# Patient Record
Sex: MALE | Race: WHITE | NOT HISPANIC OR LATINO | URBAN - METROPOLITAN AREA
[De-identification: names, ages, dates, MRNs, and addresses within clinical notes are randomized per-mention and may not be internally consistent; named-entity substitution may affect disease eponyms.]

---

## 2018-07-11 ENCOUNTER — INPATIENT (INPATIENT)
Facility: HOSPITAL | Age: 38
LOS: 1 days | Discharge: HOME | End: 2018-07-13
Attending: INTERNAL MEDICINE | Admitting: INTERNAL MEDICINE

## 2018-07-11 VITALS
TEMPERATURE: 98 F | OXYGEN SATURATION: 100 % | HEART RATE: 73 BPM | DIASTOLIC BLOOD PRESSURE: 69 MMHG | SYSTOLIC BLOOD PRESSURE: 136 MMHG | RESPIRATION RATE: 18 BRPM

## 2018-07-11 DIAGNOSIS — Z98.890 OTHER SPECIFIED POSTPROCEDURAL STATES: Chronic | ICD-10-CM

## 2018-07-11 LAB
ALBUMIN SERPL ELPH-MCNC: 4 G/DL — SIGNIFICANT CHANGE UP (ref 3.5–5.2)
ALP SERPL-CCNC: 190 U/L — HIGH (ref 30–115)
ALT FLD-CCNC: 548 U/L — HIGH (ref 0–41)
ANION GAP SERPL CALC-SCNC: 13 MMOL/L — SIGNIFICANT CHANGE UP (ref 7–14)
AST SERPL-CCNC: 241 U/L — HIGH (ref 0–41)
BASOPHILS # BLD AUTO: 0.03 K/UL — SIGNIFICANT CHANGE UP (ref 0–0.2)
BASOPHILS NFR BLD AUTO: 0.4 % — SIGNIFICANT CHANGE UP (ref 0–1)
BILIRUB DIRECT SERPL-MCNC: 2 MG/DL — HIGH (ref 0–0.2)
BILIRUB INDIRECT FLD-MCNC: 1.2 MG/DL — SIGNIFICANT CHANGE UP (ref 0.2–1.2)
BILIRUB SERPL-MCNC: 3.2 MG/DL — HIGH (ref 0.2–1.2)
BUN SERPL-MCNC: 15 MG/DL — SIGNIFICANT CHANGE UP (ref 10–20)
CALCIUM SERPL-MCNC: 9.4 MG/DL — SIGNIFICANT CHANGE UP (ref 8.5–10.1)
CHLORIDE SERPL-SCNC: 102 MMOL/L — SIGNIFICANT CHANGE UP (ref 98–110)
CO2 SERPL-SCNC: 24 MMOL/L — SIGNIFICANT CHANGE UP (ref 17–32)
CREAT SERPL-MCNC: 0.7 MG/DL — SIGNIFICANT CHANGE UP (ref 0.7–1.5)
EOSINOPHIL # BLD AUTO: 0.1 K/UL — SIGNIFICANT CHANGE UP (ref 0–0.7)
EOSINOPHIL NFR BLD AUTO: 1.4 % — SIGNIFICANT CHANGE UP (ref 0–8)
GLUCOSE SERPL-MCNC: 116 MG/DL — HIGH (ref 70–99)
HCT VFR BLD CALC: 41.2 % — LOW (ref 42–52)
HGB BLD-MCNC: 13.8 G/DL — LOW (ref 14–18)
IMM GRANULOCYTES NFR BLD AUTO: 0.4 % — HIGH (ref 0.1–0.3)
LACTATE SERPL-SCNC: 1.7 MMOL/L — SIGNIFICANT CHANGE UP (ref 0.5–2.2)
LIDOCAIN IGE QN: 41 U/L — SIGNIFICANT CHANGE UP (ref 7–60)
LYMPHOCYTES # BLD AUTO: 1.63 K/UL — SIGNIFICANT CHANGE UP (ref 1.2–3.4)
LYMPHOCYTES # BLD AUTO: 22.5 % — SIGNIFICANT CHANGE UP (ref 20.5–51.1)
MCHC RBC-ENTMCNC: 28.4 PG — SIGNIFICANT CHANGE UP (ref 27–31)
MCHC RBC-ENTMCNC: 33.5 G/DL — SIGNIFICANT CHANGE UP (ref 32–37)
MCV RBC AUTO: 84.8 FL — SIGNIFICANT CHANGE UP (ref 80–94)
MONOCYTES # BLD AUTO: 0.69 K/UL — HIGH (ref 0.1–0.6)
MONOCYTES NFR BLD AUTO: 9.5 % — HIGH (ref 1.7–9.3)
NEUTROPHILS # BLD AUTO: 4.77 K/UL — SIGNIFICANT CHANGE UP (ref 1.4–6.5)
NEUTROPHILS NFR BLD AUTO: 65.8 % — SIGNIFICANT CHANGE UP (ref 42.2–75.2)
NRBC # BLD: 0 /100 WBCS — SIGNIFICANT CHANGE UP (ref 0–0)
PLATELET # BLD AUTO: 256 K/UL — SIGNIFICANT CHANGE UP (ref 130–400)
POTASSIUM SERPL-MCNC: 4.6 MMOL/L — SIGNIFICANT CHANGE UP (ref 3.5–5)
POTASSIUM SERPL-SCNC: 4.6 MMOL/L — SIGNIFICANT CHANGE UP (ref 3.5–5)
PROT SERPL-MCNC: 7.3 G/DL — SIGNIFICANT CHANGE UP (ref 6–8)
RBC # BLD: 4.86 M/UL — SIGNIFICANT CHANGE UP (ref 4.7–6.1)
RBC # FLD: 13.4 % — SIGNIFICANT CHANGE UP (ref 11.5–14.5)
SODIUM SERPL-SCNC: 139 MMOL/L — SIGNIFICANT CHANGE UP (ref 135–146)
WBC # BLD: 7.25 K/UL — SIGNIFICANT CHANGE UP (ref 4.8–10.8)
WBC # FLD AUTO: 7.25 K/UL — SIGNIFICANT CHANGE UP (ref 4.8–10.8)

## 2018-07-11 RX ORDER — ENOXAPARIN SODIUM 100 MG/ML
40 INJECTION SUBCUTANEOUS EVERY 24 HOURS
Qty: 0 | Refills: 0 | Status: DISCONTINUED | OUTPATIENT
Start: 2018-07-11 | End: 2018-07-11

## 2018-07-11 RX ORDER — SODIUM CHLORIDE 9 MG/ML
3 INJECTION INTRAMUSCULAR; INTRAVENOUS; SUBCUTANEOUS ONCE
Qty: 0 | Refills: 0 | Status: COMPLETED | OUTPATIENT
Start: 2018-07-11 | End: 2018-07-11

## 2018-07-11 RX ORDER — SODIUM CHLORIDE 9 MG/ML
1000 INJECTION INTRAMUSCULAR; INTRAVENOUS; SUBCUTANEOUS
Qty: 0 | Refills: 0 | Status: DISCONTINUED | OUTPATIENT
Start: 2018-07-12 | End: 2018-07-13

## 2018-07-11 RX ADMIN — SODIUM CHLORIDE 3 MILLILITER(S): 9 INJECTION INTRAMUSCULAR; INTRAVENOUS; SUBCUTANEOUS at 14:46

## 2018-07-11 NOTE — ED PROVIDER NOTE - OBJECTIVE STATEMENT
Pt is a 36y/o male with no sig pmhx presents today for eval of painless jaundice, diffuse pruritis, and emmie colored stool for approx 1 week. Pt has been worked up by Dr. Sue of GI during this time and was found to have dilated CBD of 11mm. Pt denies abd pain, CP, SOB, fever, chills, n/v/d.

## 2018-07-11 NOTE — ED PROVIDER NOTE - NS ED ROS FT
Eyes:  No visual changes, eye pain or discharge.  ENMT:  No hearing changes, pain, discharge or infections. No neck pain or stiffness.  Cardiac:  No chest pain, SOB   Respiratory:  No cough or respiratory distress. No hemoptysis. No history of asthma or RAD.  GI:  No nausea, vomiting, diarrhea or abdominal pain.  MS:  No myalgia, muscle weakness, joint pain or back pain.  Neuro:  No headache or weakness.  No LOC.  Skin:  No skin rash.   Endocrine: No history of thyroid disease or diabetes.  Except as documented in the HPI,  all other systems are negative.

## 2018-07-11 NOTE — H&P ADULT - ATTENDING COMMENTS
37M p/w painless jaundice and 100lb wt loss found to have obstructive biliary disease with "stones in gall bladder" done by PMD. No toxic signs and doubt malignancy. Will get MRCP today and [facility] consultation for ERCP and biopsies / stent as needed. No need for abx at this time.   Will follow  GI/DVT prophylaxis

## 2018-07-11 NOTE — H&P ADULT - GASTROINTESTINAL DETAILS
bowel sounds normal/no distention/no masses palpable/soft/nontender/no guarding/no bruit/no rebound tenderness/no rigidity

## 2018-07-11 NOTE — H&P ADULT - ASSESSMENT
36 y/o Obese M w/ no significant PMHx was sent by his Gastroenterologist by symptoms of jaundice and pruritis x3 weeks. Pt was found to have a dislodged gallstone in the CBD w/ dilation of 11mm on RUQ sono at his GI's office. In the ED, vitals were stable and PE only remarkable for scleral icterus and corbett's sign was negative. T bili 3.2 w/ D bili of 2. ,  and . Lipase 41. Pt was admitted to medicine for management of choledocholithiasis    #Scleral icterus, dark urine, acholic stools and Transaminitis 2/2 Choledocholithiasis 2/2 Rapid weight loss of 100 lbs in the past 6 months - Stable  - In the ED, vitals stable. PE only remarkable for scleral icterus. Negative corbett's sign - No evidence of SIRS  - T bili 3.2 w/ D bili 2. . , . Alt 548.   - Lipase 41  - Currently awaiting GI recs - Consult placed for Dr. Alicia Service - Pt will require MRCP/ERCP  - Will keep pt NPO after midnight for any intervention in the am  - c/w gentle hydration at midnight  - f/u A1c - pt has family hx of T2DM  - f/u lipid profile     #Activity - Ambulate as tolerated  #Diet - DASH Diet - NPO after midnight  #DVT PPX - Lovenox  #GI PPX - Protonix  #Dispo - from home  #Code - full 38 y/o Obese M w/ no significant PMHx was sent by his Gastroenterologist by symptoms of jaundice and pruritis x3 weeks. Pt was found to have a dislodged gallstone in the CBD w/ dilation of 11mm on RUQ sono at his GI's office. In the ED, vitals were stable and PE only remarkable for scleral icterus and corbett's sign was negative. T bili 3.2 w/ D bili of 2. ,  and . Lipase 41. Pt was admitted to medicine for management of choledocholithiasis    #Scleral icterus, dark urine, acholic stools and Transaminitis 2/2 Choledocholithiasis 2/2 Rapid weight loss of 100 lbs in the past 6 months - Stable  - In the ED, vitals stable. PE only remarkable for scleral icterus. Negative corbett's sign - No evidence of SIRS  - T bili 3.2 w/ D bili 2. . , . Alt 548.   - Lipase 41  - Currently awaiting GI recs - Consult placed for Dr. Alicia Service - Pt will require MRCP/ERCP  - Will keep pt NPO after midnight for any intervention in the am  - c/w gentle hydration at midnight  - Monitor for signs of fever  - Trend LFT's  - f/u A1c - pt has family hx of T2DM  - f/u lipid profile     #Activity - Ambulate as tolerated  #Diet - DASH Diet - NPO after midnight  #DVT PPX - Lovenox  #GI PPX - Protonix  #Dispo - from home  #Code - full 36 y/o Obese M w/ no significant PMHx was sent by his Gastroenterologist by symptoms of jaundice and pruritis x3 weeks. Pt was found to have a dislodged gallstone in the CBD w/ dilation of 11mm on RUQ sono at his GI's office. In the ED, vitals were stable and PE only remarkable for scleral icterus and corbett's sign was negative. T bili 3.2 w/ D bili of 2. ,  and . Lipase 41. Pt was admitted to medicine for management of choledocholithiasis    #Painless jaundice, Scleral icterus, dark urine, acholic stools and Transaminitis 2/2 Choledocholithiasis 2/2 Rapid intentional weight loss - r/o cholangiocarcinoma - Stable  - In the ED, vitals stable. PE only remarkable for scleral icterus. Negative corbett's sign - No evidence of SIRS  - T bili 3.2 w/ D bili 2. . , . Alt 548.   - Lipase 41  - As per GI - Dr. Sue/Dr. Alicia - MRI of liver and Pancreas w/ Gadolinium. MRCP.  - Will keep pt NPO after midnight for any intervention in the am  - c/w gentle hydration at midnight  - Monitor for signs of fever  - Trend LFT's  - f/u CA 19-9, CEA, AFP  - f/u A1c - pt has family hx of T2DM  - f/u lipid profile     #Activity - Ambulate as tolerated  #Diet - DASH Diet - NPO after midnight  #DVT PPX - Lovenox  #GI PPX - Protonix  #Dispo - from home  #Code - full

## 2018-07-11 NOTE — H&P ADULT - HISTORY OF PRESENT ILLNESS
36 y/o Obese M w/ no significant PMHx was sent by his Gastroenterologist by symptoms of jaundice and pruritis. History dates back to 3 weeks ago when started experiencing itching in his distal upper and lower extremities followed by dark orange urine and acholic stools. Pt then noticed his skin becoming jaundiced and he went to his PCP and found that pt had transaminitis. He was then referred to his gastroenterologist and sonogram of the RUQ demonstrated a dilated CBDuct of 11mm. Pt also had elevated Total bilirubin of 3 and Direct bilirubin of 2.3. As per the GI doctors instructions pt came to the ED. Denies nausea, vomiting, diarrhea, fever, chills, shortness of breath, dysuria, frequency or urgency, chest pain, palpitations or fatigue.    Pt endorsed that he was participating in a weight loss challenge and lost 100lbs in 6-8 months.

## 2018-07-11 NOTE — ED PROVIDER NOTE - ATTENDING CONTRIBUTION TO CARE
Pt is a 38y/o male with no sig pmhx presents today for eval of painless jaundice, diffuse pruritis, and emmie colored stool for approx 1 week. Pt has been worked up by Dr. Sue of GI during this time and was found to have dilated CBD of 11mm. Pt denies abd pain, CP, SOB, fever, chills, n/v/d.    Vital Signs: I have reviewed the initial vital signs.  Constitutional: NAD, well-nourished, appears stated age, no acute distress.  HEENT: Airway patent, moist MM, no erythema/swelling/deformity of oral structures. EOMI, PERRLA. Mild scleral icterus bilaterally  SKIN: Mild jaundice diffusely, no rashes  CV: regular rate, regular rhythm, well-perfused extremities, 2+ b/l DP and radial pulses equal.  Lungs: BCTA, no increased WOB.  ABD: NTND, no guarding or rebound, no pulsatile mass, no hernias.   MSK: Neck supple, nontender, nl ROM, no stepoff. Chest nontender. Back nontender in TLS spine or to b/l bony structures or flanks. Ext nontender, nl rom, no deformity.   INTEG: Skin warm, dry, no rash.  NEURO: A&Ox3, CN II-XII intact, normal strength 5/5 all 4 ext, nl sensation throughout, normal speech and coordination.  PSYCH: Calm, cooperative, normal affect and interaction.      Patient well-appearing except for jaundice. Will speak with GI, likely admit for further work up.

## 2018-07-11 NOTE — ED PROVIDER NOTE - PHYSICAL EXAMINATION
VITAL SIGNS: I have reviewed nursing notes and confirm.  CONSTITUTIONAL: Well-developed; well-nourished; in no acute distress.   SKIN: skin exam is warm and dry, no acute rash.    HEAD: Normocephalic; atraumatic.  EYES:  + scleral icterus  ENT: No nasal discharge; airway clear.  CARD: S1, S2 normal; no murmurs, gallops, or rubs. Regular rate and rhythm.   RESP: No wheezes, rales or rhonchi.  ABD: Normal bowel sounds; soft; non-distended; non-tender  EXT: Normal ROM.  No clubbing, cyanosis or edema.   NEURO: Alert, oriented, grossly unremarkable

## 2018-07-11 NOTE — ED ADULT TRIAGE NOTE - CHIEF COMPLAINT QUOTE
Patient was experiencing jaundice, emmie colored stools and generalized itchiness. Had US done outpatient, sent in by PMD for enlarged bile duct and gallstones. Jese pain at this time

## 2018-07-11 NOTE — H&P ADULT - FAMILY HISTORY
Father  Still living? Unknown  Family history of type 2 diabetes mellitus in father, Age at diagnosis: Age Unknown     Mother  Still living? Unknown  Family history of breast cancer in mother, Age at diagnosis: Age Unknown

## 2018-07-11 NOTE — ED PROVIDER NOTE - PROGRESS NOTE DETAILS
Discussed case with Pt's GI specialist Dr. Sue and requests Dr. Bal consult as inpatient for extensive workup of painless jaundice. Discussed case with Pt's GI specialist Dr. Sue and requests Dr. Bal consult as inpatient for extensive workup of painless jaundice. No need for imaging in ED. Just labs Spoke with MAR for admission

## 2018-07-11 NOTE — CONSULT NOTE ADULT - ASSESSMENT
38 y/o Obese M w/ no significant PMHx was sent by his Gastroenterologist by symptoms of jaundice and pruritis x3 weeks. Pt was found to have a  CBD dilation of 11mm on RUQ sono at his GI's office, picture suggestive of choledocholithiasis.    - Abnormal LFTs with CBD dilatation:  patient was having abdominal pain prior to jaundice  rule out choledocholithiasis, vs less likely malignancy ( cholangiocarcinoma or pancreatic ca)  no signs of cholangitis at this point  - As per GI - Dr. Sue/Dr. Alicia - MRI of liver and Pancreas w/ Gadolinium. MRCP.  -  NPO after midnight for possible ERCP in the am  - monitor LFTs

## 2018-07-11 NOTE — CONSULT NOTE ADULT - SUBJECTIVE AND OBJECTIVE BOX
Chief Complaint:  Patient is a 37y old  Male who presents with a chief complaint of Choledocholithiasis (11 Jul 2018 16:10)      HPI:  36 y/o Obese M w/ no significant PMHx was sent by his Gastroenterologist by symptoms of jaundice and pruritis. History dates back to 3 weeks ago when started experiencing epigastric pain radiating to the back associated with nausea and vomiting that lasted for 2 days then started to have itching in his distal upper and lower extremities followed by dark orange urine and acholic stools. Pt then noticed his skin becoming jaundiced and he went to his PCP and found that pt had transaminitis. He was then referred to his gastroenterologist and sonogram of the RUQ demonstrated a dilated CBDuct of 11mm. Pt also had elevated Total bilirubin of 3 and Direct bilirubin of 2.3. As per the GI doctors instructions pt came to the ED. Denies nausea, vomiting, diarrhea, fever, chills, shortness of breath, dysuria, frequency or urgency, chest pain, palpitations or fatigue.    Pt endorsed that he was participating in a weight loss challenge and lost 100lbs in 6-8 months.   Allergies:  Ceclor (Hives)  penicillin (Hives)    [facility] Medications:  enoxaparin Injectable 40 milliGRAM(s) SubCutaneous every 24 hours      PMHX/PSHX:  No pertinent past medical history  S/P ACL repair  No significant past surgical history      Family history:  Family history of breast cancer in mother (Mother)  Family history of type 2 diabetes mellitus in father (Father)  No pertinent family history in first degree relatives    ROS:   Eyes:  Good vision, no reported pain  ENT:  No sore throat, pain, runny nose, dysphagia  CV:  No pain, palpitations, hypo/hypertension  Resp:  No dyspnea, cough, tachypnea, wheezing  GI:  see H&P  :  No pain, bleeding, incontinence, nocturia  Neuro:  No weakness, tingling, memory problems  Psych:  No fatigue, insomnia, mood problems, depression  Endocrine:  No polyuria, polydipsia, cold/heat intolerance  Heme:  No petechiae, ecchymosis, easy bruisability  Skin:  No rash, tattoos, scars, edema      PHYSICAL EXAM:   Vital Signs:  Vital Signs Last 24 Hrs  T(C): 36.4 (11 Jul 2018 16:34), Max: 36.8 (11 Jul 2018 12:12)  T(F): 97.5 (11 Jul 2018 16:34), Max: 98.2 (11 Jul 2018 12:12)  HR: 59 (11 Jul 2018 16:34) (59 - 73)  BP: 123/62 (11 Jul 2018 16:34) (123/62 - 136/69)  BP(mean): --  RR: 18 (11 Jul 2018 16:34) (18 - 18)  SpO2: 99% (11 Jul 2018 16:34) (99% - 100%)  Daily     Daily     GENERAL:  Appears stated age, well-groomed, well-nourished, no distress  HEENT:  NC/AT,  conjunctivae clear and pink, no thyromegaly, nodules, adenopathy, no JVD, sclera + jaundice  CHEST:  Full & symmetric excursion, no increased effort, breath sounds clear  HEART:  Regular rhythm, S1, S2, no murmur/rub/S3/S4, no abdominal bruit, no edema  ABDOMEN:  Soft, non-tender, non-distended, normoactive bowel sounds,  no masses ,no hepato-splenomegaly,   EXTEREMITIES:  no cyanosis,clubbing or edema  SKIN:  No rash/erythema/ecchymoses/petechiae/wounds/abscess/warm/dry  NEURO:  Alert, oriented, no asterixis, no tremor, no encephalopathy    LABS:                        13.8   7.25  )-----------( 256      ( 11 Jul 2018 13:53 )             41.2     07-11    139  |  102  |  15  ----------------------------<  116<H>  4.6   |  24  |  0.7    Ca    9.4      11 Jul 2018 13:53    TPro  7.3  /  Alb  4.0  /  TBili  3.2<H>  /  DBili  2.0<H>  /  AST  241<H>  /  ALT  548<H>  /  AlkPhos  190<H>  07-11    LIVER FUNCTIONS - ( 11 Jul 2018 13:53 )  Alb: 4.0 g/dL / Pro: 7.3 g/dL / ALK PHOS: 190 U/L / ALT: 548 U/L / AST: 241 U/L / GGT: x               Amylase Serum--      Lipase serum41

## 2018-07-11 NOTE — ED ADULT NURSE NOTE - OBJECTIVE STATEMENT
Pt was sent by PMD for MRI. Pt c/o jaundice, light colored stool, and itchiness x 3 weeks. Pt denies any pain at this time.  Pt had US done and showed pt had gallstones and enlarged bile duct.

## 2018-07-12 LAB
ALBUMIN SERPL ELPH-MCNC: 4 G/DL — SIGNIFICANT CHANGE UP (ref 3.5–5.2)
ALP SERPL-CCNC: 179 U/L — HIGH (ref 30–115)
ALT FLD-CCNC: 564 U/L — HIGH (ref 0–41)
ANION GAP SERPL CALC-SCNC: 11 MMOL/L — SIGNIFICANT CHANGE UP (ref 7–14)
AST SERPL-CCNC: 234 U/L — HIGH (ref 0–41)
BASOPHILS # BLD AUTO: 0.03 K/UL — SIGNIFICANT CHANGE UP (ref 0–0.2)
BASOPHILS NFR BLD AUTO: 0.5 % — SIGNIFICANT CHANGE UP (ref 0–1)
BILIRUB DIRECT SERPL-MCNC: 1.7 MG/DL — HIGH (ref 0–0.2)
BILIRUB DIRECT SERPL-MCNC: 1.7 MG/DL — HIGH (ref 0–0.2)
BILIRUB INDIRECT FLD-MCNC: 0.9 MG/DL — SIGNIFICANT CHANGE UP (ref 0.2–1.2)
BILIRUB INDIRECT FLD-MCNC: 0.9 MG/DL — SIGNIFICANT CHANGE UP (ref 0.2–1.2)
BILIRUB SERPL-MCNC: 2.6 MG/DL — HIGH (ref 0.2–1.2)
BILIRUB SERPL-MCNC: 2.6 MG/DL — HIGH (ref 0.2–1.2)
BLD GP AB SCN SERPL QL: SIGNIFICANT CHANGE UP
BUN SERPL-MCNC: 14 MG/DL — SIGNIFICANT CHANGE UP (ref 10–20)
CALCIUM SERPL-MCNC: 9.4 MG/DL — SIGNIFICANT CHANGE UP (ref 8.5–10.1)
CHLORIDE SERPL-SCNC: 102 MMOL/L — SIGNIFICANT CHANGE UP (ref 98–110)
CHOLEST SERPL-MCNC: 278 MG/DL — HIGH (ref 100–200)
CO2 SERPL-SCNC: 25 MMOL/L — SIGNIFICANT CHANGE UP (ref 17–32)
CREAT SERPL-MCNC: 0.8 MG/DL — SIGNIFICANT CHANGE UP (ref 0.7–1.5)
EOSINOPHIL # BLD AUTO: 0.1 K/UL — SIGNIFICANT CHANGE UP (ref 0–0.7)
EOSINOPHIL NFR BLD AUTO: 1.5 % — SIGNIFICANT CHANGE UP (ref 0–8)
ESTIMATED AVERAGE GLUCOSE: 123 MG/DL — HIGH (ref 68–114)
GLUCOSE SERPL-MCNC: 125 MG/DL — HIGH (ref 70–99)
HBA1C BLD-MCNC: 5.9 % — HIGH (ref 4–5.6)
HCT VFR BLD CALC: 41.1 % — LOW (ref 42–52)
HDLC SERPL-MCNC: 34 MG/DL — LOW (ref 40–125)
HGB BLD-MCNC: 13.6 G/DL — LOW (ref 14–18)
IMM GRANULOCYTES NFR BLD AUTO: 0.6 % — HIGH (ref 0.1–0.3)
LIPID PNL WITH DIRECT LDL SERPL: 218 MG/DL — HIGH (ref 4–129)
LYMPHOCYTES # BLD AUTO: 1.69 K/UL — SIGNIFICANT CHANGE UP (ref 1.2–3.4)
LYMPHOCYTES # BLD AUTO: 25.5 % — SIGNIFICANT CHANGE UP (ref 20.5–51.1)
MCHC RBC-ENTMCNC: 28 PG — SIGNIFICANT CHANGE UP (ref 27–31)
MCHC RBC-ENTMCNC: 33.1 G/DL — SIGNIFICANT CHANGE UP (ref 32–37)
MCV RBC AUTO: 84.6 FL — SIGNIFICANT CHANGE UP (ref 80–94)
MONOCYTES # BLD AUTO: 0.56 K/UL — SIGNIFICANT CHANGE UP (ref 0.1–0.6)
MONOCYTES NFR BLD AUTO: 8.5 % — SIGNIFICANT CHANGE UP (ref 1.7–9.3)
NEUTROPHILS # BLD AUTO: 4.2 K/UL — SIGNIFICANT CHANGE UP (ref 1.4–6.5)
NEUTROPHILS NFR BLD AUTO: 63.4 % — SIGNIFICANT CHANGE UP (ref 42.2–75.2)
NRBC # BLD: 0 /100 WBCS — SIGNIFICANT CHANGE UP (ref 0–0)
PLATELET # BLD AUTO: 262 K/UL — SIGNIFICANT CHANGE UP (ref 130–400)
POTASSIUM SERPL-MCNC: 4.5 MMOL/L — SIGNIFICANT CHANGE UP (ref 3.5–5)
POTASSIUM SERPL-SCNC: 4.5 MMOL/L — SIGNIFICANT CHANGE UP (ref 3.5–5)
PROT SERPL-MCNC: 6.9 G/DL — SIGNIFICANT CHANGE UP (ref 6–8)
RBC # BLD: 4.86 M/UL — SIGNIFICANT CHANGE UP (ref 4.7–6.1)
RBC # FLD: 13.7 % — SIGNIFICANT CHANGE UP (ref 11.5–14.5)
SODIUM SERPL-SCNC: 138 MMOL/L — SIGNIFICANT CHANGE UP (ref 135–146)
TOTAL CHOLESTEROL/HDL RATIO MEASUREMENT: 8.2 RATIO — HIGH (ref 4–5.5)
TRIGL SERPL-MCNC: 180 MG/DL — HIGH (ref 10–149)
TYPE + AB SCN PNL BLD: SIGNIFICANT CHANGE UP
WBC # BLD: 6.62 K/UL — SIGNIFICANT CHANGE UP (ref 4.8–10.8)
WBC # FLD AUTO: 6.62 K/UL — SIGNIFICANT CHANGE UP (ref 4.8–10.8)

## 2018-07-12 NOTE — PROGRESS NOTE ADULT - ASSESSMENT
38 y/o Obese M w/ no significant PMHx was sent by his Gastroenterologist by symptoms of jaundice and pruritis x3 weeks. Pt was found to have a dislodged gallstone in the CBD w/ dilation of 11mm on RUQ sono at his GI's office. Pt was admitted to medicine for management of choledocholithiasis as well as rule out other causes of Obstructive/Painless Jaundice R/O Cholangiocarcinoma or billary disease.    #Painless jaundice, Scleral icterus, dark urine, acholic stools and Transaminitis 2/2 Choledocholithiasis 2/2 Rapid intentional weight loss - r/o cholangiocarcinoma - Stable  - No evidence of SIRS  - T bili 3.2 w/ D bili 2. . , . Alt 548.   - Lipase 41  - As per GI - Dr. Sue/Dr. Alicia - MRI of liver and Pancreas w/ Gadolinium. MRCP.  - Will keep pt NPO after midnight for possible ERCP in AM  - c/w gentle hydration at midnight  - Monitor for signs of fever  - Trend LFT's  - f/u CA 19-9, CEA, AFP  - f/u A1c - pt has family hx of T2DM  - f/u lipid profile     #Activity - Ambulate as tolerated  #Diet - DASH Diet - NPO after midnight  #DVT PPX - Lovenox  #GI PPX - Protonix  #Dispo - from home  #Code - full 36 y/o Obese M w/ no significant PMHx was sent by his Gastroenterologist by symptoms of jaundice and pruritis x3 weeks. Pt was found to have a dislodged gallstone in the CBD w/ dilation of 11mm on RUQ sono at his GI's office. Pt was admitted to medicine for management of choledocholithiasis as well as rule out other causes of Obstructive/Painless Jaundice R/O Cholangiocarcinoma or billary disease.    #Painless jaundice, Scleral icterus, dark urine, acholic stools and Transaminitis 2/2 Choledocholithiasis 2/2 Rapid intentional weight loss - r/o cholangiocarcinoma - Stable  - No evidence of SIRS  - T bili now 2.6  w/ D bili 1.7  ,  Alt 548.   - Total Cholesterol 278, Tags 180, HDL 34,   - Lipase 41  - As per GI - Dr. Sue/Dr. Alicia - MRI of liver and Pancreas w/ Gadolinium. MRCP.  - Will keep pt NPO after midnight for possible ERCP in AM  - c/w gentle hydration at midnight  - Monitor for signs of fever  - Trend LFT's  - f/u CA 19-9, CEA, AFP  - f/u A1c - pt has family hx of T2DM       #Activity - Ambulate as tolerated  #Diet - DASH Diet - NPO after midnight  #DVT PPX - Lovenox  #GI PPX - Protonix  #Dispo - from home  #Code - full

## 2018-07-12 NOTE — PROGRESS NOTE ADULT - SUBJECTIVE AND OBJECTIVE BOX
SUBJECTIVE:    Patient is a 37y old Male who presents with a chief complaint of Choledocholithiasis   (11 Jul 2018 16:10) He was sent by his gastroenterologist  with sx of Jaundice and pruitis for 3 weeks. History of 100 lbs weight loss in 6-8 months. Patient was found to have dislodged gallstone in the CBD w/ dilation of 11mm on RUQ sono in his office. In the ED vitals were stable and PE only remarkable for Jaundice.     Currently admitted to medicine with the primary diagnosis of Choledocholithiasis vs Obstructive Liver Diseases    Today is hospital day 1d. This morning he is resting comfortably in bed and reports no new issues or overnight events.     PAST MEDICAL & SURGICAL HISTORY  No pertinent past medical history  S/P ACL repair    SOCIAL HISTORY:  Negative for smoking/alcohol/drug use.     ALLERGIES:  anesthetic :reaction to unknown anesthetic during previous surgery for tear in Anterior cruciate ligament. (Stomach Upset; Vomiting)  Ceclor (Hives)  penicillin (Hives)    MEDICATIONS:  STANDING MEDICATIONS  sodium chloride 0.9%. 1000 milliLiter(s) IV Continuous <Continuous>    PRN MEDICATIONS    VITALS:   T(F): 96.8  HR: 65  BP: 132/59  RR: 20  SpO2: 100%    LABS:                        13.6   6.62  )-----------( 262      ( 12 Jul 2018 09:30 )             41.1     07-12    138  |  102  |  14  ----------------------------<  125<H>  4.5   |  25  |  0.8    Ca    9.4      12 Jul 2018 09:30    TPro  6.9  /  Alb  4.0  /  TBili  2.6<H>  /  DBili  1.7<H>  /  AST  234<H>  /  ALT  564<H>  /  AlkPhos  179<H>  07-12      RADIOLOGY:      PHYSICAL EXAM:  GEN: No acute distress  HEENT: Scleral Icterus, mucosal yellowing  LUNGS: Clear to auscultation bilaterally   HEART: S1/S2 present. RRR.   ABD: Soft, non-tender, non-distended. Bowel sounds present, negative corbett's   EXT: NC/NC/NE/2+PP/TURNER/Skin Intact.   NEURO: AAOX3

## 2018-07-13 VITALS
HEART RATE: 74 BPM | RESPIRATION RATE: 20 BRPM | SYSTOLIC BLOOD PRESSURE: 134 MMHG | DIASTOLIC BLOOD PRESSURE: 62 MMHG | TEMPERATURE: 98 F

## 2018-07-13 LAB
AFP-TM SERPL-MCNC: 2 NG/ML — SIGNIFICANT CHANGE UP
ALBUMIN SERPL ELPH-MCNC: 3.6 G/DL — SIGNIFICANT CHANGE UP (ref 3.5–5.2)
ALP SERPL-CCNC: 151 U/L — HIGH (ref 30–115)
ALT FLD-CCNC: 474 U/L — HIGH (ref 0–41)
ANION GAP SERPL CALC-SCNC: 12 MMOL/L — SIGNIFICANT CHANGE UP (ref 7–14)
APTT BLD: 34.5 SEC — SIGNIFICANT CHANGE UP (ref 27–39.2)
AST SERPL-CCNC: 198 U/L — HIGH (ref 0–41)
BILIRUB DIRECT SERPL-MCNC: 1.7 MG/DL — HIGH (ref 0–0.2)
BILIRUB INDIRECT FLD-MCNC: 0.8 MG/DL — SIGNIFICANT CHANGE UP (ref 0.2–1.2)
BILIRUB SERPL-MCNC: 2.5 MG/DL — HIGH (ref 0.2–1.2)
BUN SERPL-MCNC: 18 MG/DL — SIGNIFICANT CHANGE UP (ref 10–20)
CALCIUM SERPL-MCNC: 8.7 MG/DL — SIGNIFICANT CHANGE UP (ref 8.5–10.1)
CANCER AG19-9 SERPL-ACNC: 172.5 U/ML — HIGH
CHLORIDE SERPL-SCNC: 104 MMOL/L — SIGNIFICANT CHANGE UP (ref 98–110)
CO2 SERPL-SCNC: 25 MMOL/L — SIGNIFICANT CHANGE UP (ref 17–32)
CREAT SERPL-MCNC: 0.8 MG/DL — SIGNIFICANT CHANGE UP (ref 0.7–1.5)
GLUCOSE SERPL-MCNC: 113 MG/DL — HIGH (ref 70–99)
HCT VFR BLD CALC: 38.2 % — LOW (ref 42–52)
HGB BLD-MCNC: 12.7 G/DL — LOW (ref 14–18)
INR BLD: 1.14 RATIO — SIGNIFICANT CHANGE UP (ref 0.65–1.3)
MCHC RBC-ENTMCNC: 28.2 PG — SIGNIFICANT CHANGE UP (ref 27–31)
MCHC RBC-ENTMCNC: 33.2 G/DL — SIGNIFICANT CHANGE UP (ref 32–37)
MCV RBC AUTO: 84.9 FL — SIGNIFICANT CHANGE UP (ref 80–94)
NRBC # BLD: 0 /100 WBCS — SIGNIFICANT CHANGE UP (ref 0–0)
PLATELET # BLD AUTO: 212 K/UL — SIGNIFICANT CHANGE UP (ref 130–400)
POTASSIUM SERPL-MCNC: 4.5 MMOL/L — SIGNIFICANT CHANGE UP (ref 3.5–5)
POTASSIUM SERPL-SCNC: 4.5 MMOL/L — SIGNIFICANT CHANGE UP (ref 3.5–5)
PROT SERPL-MCNC: 6 G/DL — SIGNIFICANT CHANGE UP (ref 6–8)
PROTHROM AB SERPL-ACNC: 12.3 SEC — SIGNIFICANT CHANGE UP (ref 9.95–12.87)
RBC # BLD: 4.5 M/UL — LOW (ref 4.7–6.1)
RBC # FLD: 13.4 % — SIGNIFICANT CHANGE UP (ref 11.5–14.5)
SODIUM SERPL-SCNC: 141 MMOL/L — SIGNIFICANT CHANGE UP (ref 135–146)
WBC # BLD: 6.24 K/UL — SIGNIFICANT CHANGE UP (ref 4.8–10.8)
WBC # FLD AUTO: 6.24 K/UL — SIGNIFICANT CHANGE UP (ref 4.8–10.8)

## 2018-07-13 RX ORDER — MOXIFLOXACIN HYDROCHLORIDE TABLETS, 400 MG 400 MG/1
500 TABLET, FILM COATED ORAL
Qty: 2000 | Refills: 0 | OUTPATIENT
Start: 2018-07-13 | End: 2018-07-14

## 2018-07-13 NOTE — PROGRESS NOTE ADULT - ASSESSMENT
36 y/o Obese M w/ no significant PMHx was sent by his Gastroenterologist by symptoms of jaundice and pruritis x3 weeks. Pt was found to have a dislodged gallstone in the CBD w/ dilation of 11mm on RUQ sono at his GI's office. Pt was admitted to medicine for management of choledocholithiasis as well as rule out other causes of Obstructive/Painless Jaundice R/O Cholangiocarcinoma or billary disease.    #Painless jaundice, Scleral icterus, dark urine, acholic stools and Transaminitis 2/2 Choledocholithiasis 2/2 Rapid intentional weight loss - r/o cholangiocarcinoma - Stable    -ERCP Today  MRCP: 8x6mm CBD Stone w/ 11mm dilation with intrahepatic ductal dilation  - No evidence of SIRS  - T bili 2.5 trending down from 3.2  w/ D bili 1.7  ,  Alt 474- improving  - Total Cholesterol 278, Tags 180, HDL 34,   - Lipase 41  - Monitor for signs of fever  - Trend LFT's  - f/u CA 19-9, CEA, AFP  - f/u A1c - pt has family hx of T2DM       #Activity - Ambulate as tolerated  #Diet - DASH Diet - NPO after midnight  #DVT PPX - Lovenox  #GI PPX - Protonix  #Dispo - from home  #Code - full 38 y/o Obese M w/ no significant PMHx was sent by his Gastroenterologist by symptoms of jaundice and pruritis x3 weeks. Pt was found to have a dislodged gallstone in the CBD w/ dilation of 11mm on RUQ sono at his GI's office. Pt was admitted to medicine for management of choledocholithiasis as well as rule out other causes of Obstructive/Painless Jaundice R/O Cholangiocarcinoma or billary disease.    #Painless jaundice, Scleral icterus, dark urine, acholic stools and Transaminitis 2/2 Choledocholithiasis 2/2 Rapid intentional weight loss - r/o cholangiocarcinoma - Stable    -ERCP Today  MRCP: 8x6mm CBD Stone w/ 11mm dilation with intrahepatic ductal dilation  - No evidence of SIRS  - T bili 2.5 trending down from 3.2  w/ D bili 1.7  ,  Alt 474- improving  - Total Cholesterol 278, Tags 180, HDL 34,   - Lipase 41  - Monitor for signs of fever  - Trend LFT's  - f/u CA 19-9, CEA, AFP  - f/u A1c - pt has family hx of T2DM  F/U GI recommendations        #Activity - Ambulate as tolerated  #Diet - DASH Diet - NPO after midnight  #DVT PPX - Lovenox  #GI PPX - Protonix  #Dispo - from home  #Code - full 36 y/o Obese M w/ no significant PMHx was sent by his Gastroenterologist by symptoms of jaundice and pruritis x3 weeks. Pt was found to have a dislodged gallstone in the CBD w/ dilation of 11mm on RUQ sono at his GI's office. Pt was admitted to medicine for management of choledocholithiasis as well as rule out other causes of Obstructive/Painless Jaundice R/O Cholangiocarcinoma or billary disease.    #Painless jaundice, Scleral icterus, dark urine, acholic stools and Transaminitis 2/2 Choledocholithiasis 2/2 Rapid intentional weight loss - r/o cholangiocarcinoma - Stable    -ERCP Today was cancelled due to emergent case, patient will be scheduled for ERCP outpatient on Monday 7/16/18 8:30AM  Per GI patient to go home on Ciprofloxacin 500mg BID for 3 days  Patient asymptomatic    MRCP: 8x6mm CBD Stone w/ 11mm dilation with intrahepatic ductal dilation  - No evidence of SIRS  - T bili 2.5 trending down from 3.2  w/ D bili 1.7  ,  Alt 474- improving  - Total Cholesterol 278, Tags 180, HDL 34,   - Lipase 41  - Monitor for signs of fever  - Trend LFT's  - f/u CA 19-9, CEA, AFP  - f/u A1c - pt has family hx of T2DM       #Activity - Ambulate as tolerated  #Diet - DASH Diet - NPO after midnight  #DVT PPX - Lovenox  #GI PPX - Protonix  #Dispo - from home  #Code - full 38 y/o Obese M w/ no significant PMHx was sent by his Gastroenterologist by symptoms of jaundice and pruritis x3 weeks. Pt was found to have a dislodged gallstone in the CBD w/ dilation of 11mm on RUQ sono at his GI's office. Pt was admitted to medicine for management of choledocholithiasis as well as rule out other causes of Obstructive/Painless Jaundice R/O Cholangiocarcinoma or billary disease.    #Painless jaundice, Scleral icterus, dark urine, acholic stools and Transaminitis 2/2 Choledocholithiasis 2/2 Rapid intentional weight loss - r/o cholangiocarcinoma - Stable    -ERCP Today was cancelled due to emergent case, patient will be scheduled for ERCP outpatient on Monday 7/16/18 8:30AM  Per GI patient to go home on Ciprofloxacin 500mg BID for 3 days  Patient asymptomatic    MRCP: 8x6mm CBD Stone w/ 11mm dilation with intrahepatic ductal dilation  - No evidence of SIRS  - T bili 2.5 trending down from 3.2  w/ D bili 1.7  ,  Alt 474- improving  - Total Cholesterol 278, Tags 180, HDL 34,   - Lipase 41  - Monitor for signs of fever  - Trend LFT's  - f/u CA 19-9, CEA, AFP  - f/u A1c - pt has family hx of T2DM     #Activity - Ambulate as tolerated  #Diet - DASH Diet - NPO after midnight  #DVT PPX - Lovenox  #GI PPX - Protonix  #Dispo - from home  #Code - full    Attending Attestation:  PT seen and examined and case and plan discussed in detail with pt, pt's wife, 4C Team and Dr. Bal.   Obstructive Jaundice 2/2 CBD Sone Disease with Cholelithiasis  Plan: D/C home f/u on Monday 8am for ERCP and further intervention. No abx at this time as no evidence of infectious process.   Time spent 30 min for care coordination

## 2018-07-13 NOTE — DISCHARGE NOTE ADULT - HOSPITAL COURSE
This is a 36 y/o male with no PMHX presents with Jaundice from the Gastroenterologist office for 3 weeks. An ultra sound in the office showed there was CBD dilation and since there was painless Jaundice patient was sent to the Emergency room to be evaluated for Choledocolithiasis vs. Malignancy due to recent intentional 100lb weight loss, and admitted for MRCP and possible ERCP. During his hospital stay patient appeared Jaundiced but with no abdominal complaints. MRCP showed 8x6mm obstructing stone in the CBD of 11 mm. Due to an emergent case patients ERCP was canceled and rescheduled outpatient at 8:30AM Monday Morning 7/16/18. Patient understands that if he feels sick this weekend with fevers or abdominal complaints he will return to the Emergency Department. Patient is asymptomatic at this time.

## 2018-07-13 NOTE — PROGRESS NOTE ADULT - SUBJECTIVE AND OBJECTIVE BOX
SUBJECTIVE:    Patient is a 37y old Male who presents with a chief complaint of Choledocholithiasis (11 Jul 2018 16:10)    Currently admitted to medicine with the primary diagnosis of Choledocholithiasis     Today is hospital day 2d. This morning he is resting comfortably in bed no pain, N/v/ constipation, diarrhea, or weakness. Feels fine awaiting ERCP today.   PAST MEDICAL & SURGICAL HISTORY  No pertinent past medical history  S/P ACL repair    SOCIAL HISTORY:  Negative for smoking/alcohol/drug use.     ALLERGIES:  anesthetic :reaction to unknown anesthetic during previous surgery for tear in Anterior cruciate ligament. (Stomach Upset; Vomiting)  Ceclor (Hives)  penicillin (Hives)    MEDICATIONS:  STANDING MEDICATIONS  sodium chloride 0.9%. 1000 milliLiter(s) IV Continuous <Continuous>    PRN MEDICATIONS    VITALS:   T(F): 98.1  HR: 74  BP: 134/62  RR: 20  SpO2: --    LABS:                        12.7   6.24  )-----------( 212      ( 13 Jul 2018 04:41 )             38.2     07-13    141  |  104  |  18  ----------------------------<  113<H>  4.5   |  25  |  0.8    Ca    8.7      13 Jul 2018 04:41    TPro  6.0  /  Alb  3.6  /  TBili  2.5<H>  /  DBili  1.7<H>  /  AST  198<H>  /  ALT  474<H>  /  AlkPhos  151<H>  07-13    PT/INR - ( 13 Jul 2018 04:41 )   PT: 12.30 sec;   INR: 1.14 ratio         PTT - ( 13 Jul 2018 04:41 )  PTT:34.5 sec    PHYSICAL EXAM:  GEN: No acute distress  HEENT: Scleral Icterus improving  LUNGS: Clear to auscultation bilaterally   HEART: S1/S2 present. RRR.   ABD: Soft, non-tender, non-distended. Bowel sounds present  EXT: NC/NC/NE/2+PP/TURNER/Skin Intact.   NEURO: AAOX3 SUBJECTIVE:    Patient is a 37y old Male who presents with a chief complaint of Choledocholithiasis (11 Jul 2018 16:10)  Currently admitted to medicine with the primary diagnosis of Choledocholithiasis r/o Obstructive Jaundice vs. Cholangiocarcinoma     Today is hospital day 2d. This morning he is resting comfortably in bed no pain, N/v/ constipation, diarrhea, or weakness. Feels fine awaiting ERCP today.     PAST MEDICAL & SURGICAL HISTORY  No pertinent past medical history  S/P ACL repair    SOCIAL HISTORY:  Negative for smoking/alcohol/drug use.     ALLERGIES:  anesthetic :reaction to unknown anesthetic during previous surgery for tear in Anterior cruciate ligament. (Stomach Upset; Vomiting)  Ceclor (Hives)  penicillin (Hives)    MEDICATIONS:  STANDING MEDICATIONS  sodium chloride 0.9%. 1000 milliLiter(s) IV Continuous <Continuous>    PRN MEDICATIONS    VITALS:   T(F): 98.1  HR: 74  BP: 134/62  RR: 20  SpO2: --    LABS:                        12.7   6.24  )-----------( 212      ( 13 Jul 2018 04:41 )             38.2     07-13    141  |  104  |  18  ----------------------------<  113<H>  4.5   |  25  |  0.8    Ca    8.7      13 Jul 2018 04:41    TPro  6.0  /  Alb  3.6  /  TBili  2.5<H>  /  DBili  1.7<H>  /  AST  198<H>  /  ALT  474<H>  /  AlkPhos  151<H>  07-13    PT/INR - ( 13 Jul 2018 04:41 )   PT: 12.30 sec;   INR: 1.14 ratio         PTT - ( 13 Jul 2018 04:41 )  PTT:34.5 sec    PHYSICAL EXAM:  GEN: No acute distress  HEENT: Scleral Icterus improving  LUNGS: Clear to auscultation bilaterally   HEART: S1/S2 present. RRR.   ABD: Soft, non-tender, non-distended. Bowel sounds present  EXT: NC/NC/NE/2+PP/TURNER/Skin Intact.   NEURO: AAOX3 SUBJECTIVE:    Patient is a 37y old Male who presents with a chief complaint of Choledocholithiasis (11 Jul 2018 16:10)  Currently admitted to medicine with the primary diagnosis of Choledocholithiasis r/o Obstructive Jaundice vs. Cholangiocarcinoma     Today is hospital day 2d. This morning he is resting comfortably in bed no pain, N/v/ constipation, diarrhea, or weakness. Feels fine awaiting ERCP today.     PAST MEDICAL & SURGICAL HISTORY  No pertinent past medical history  S/P ACL repair    SOCIAL HISTORY:  Negative for smoking/alcohol/drug use.     ALLERGIES:  anesthetic :reaction to unknown anesthetic during previous surgery for tear in Anterior cruciate ligament. (Stomach Upset; Vomiting)  Ceclor (Hives)  penicillin (Hives)    MEDICATIONS:  STANDING MEDICATIONS  sodium chloride 0.9%. 1000 milliLiter(s) IV Continuous <Continuous>    PRN MEDICATIONS    VITALS:   T(F): 98.1  HR: 74  BP: 134/62  RR: 20    LABS:                        12.7   6.24  )-----------( 212      ( 13 Jul 2018 04:41 )             38.2     07-13    141  |  104  |  18  ----------------------------<  113<H>  4.5   |  25  |  0.8    Ca    8.7      13 Jul 2018 04:41    TPro  6.0  /  Alb  3.6  /  TBili  2.5<H>  /  DBili  1.7<H>  /  AST  198<H>  /  ALT  474<H>  /  AlkPhos  151<H>  07-13    PT/INR - ( 13 Jul 2018 04:41 )   PT: 12.30 sec;   INR: 1.14 ratio      PTT - ( 13 Jul 2018 04:41 )  PTT:34.5 sec    PHYSICAL EXAM:  GEN: No acute distress  HEENT: Scleral Icterus improving  LUNGS: Clear to auscultation bilaterally   HEART: S1/S2 present. RRR.   ABD: Soft, non-tender, non-distended. Bowel sounds present  EXT: NC/NC/NE/2+PP/TURNER/Skin Intact.   NEURO: AAOX3

## 2018-07-13 NOTE — DISCHARGE NOTE ADULT - PLAN OF CARE
Resolution of symptoms and follow up with GI Patient is stable and asymptomatic at this time, is to follow up with Dr. Valeriano BOSS for outpatient ERCP on Monday at 8:30.  He will go home on 3 days of Ciprofloxacin for 3 days to cover for any developing infection. Will follow GI recommendations. Instructed to go to the Emergency Room if develops and symptoms of Abdominal Pain, Nausea, or Fever.

## 2018-07-13 NOTE — PROGRESS NOTE ADULT - SUBJECTIVE AND OBJECTIVE BOX
Chief Complaint:  Patient is a 37y old  Male who presents with a chief complaint of Choledocholithiasis (13 Jul 2018 15:54)      Interval Events:   no acute events overnight, patient has no abdominal pain, N/V, no fever.    Allergies:  anesthetic :reaction to unknown anesthetic during previous surgery for tear in Anterior cruciate ligament. (Stomach Upset; Vomiting)  Ceclor (Hives)  penicillin (Hives)      PMHX/PSHX:  No pertinent past medical history  S/P ACL repair  No significant past surgical history      Family history:  Family history of breast cancer in mother (Mother)  Family history of type 2 diabetes mellitus in father (Father)  No pertinent family history in first degree relatives      ROS:   Eyes:  Good vision, no reported pain  ENT:  No sore throat, pain, runny nose, dysphagia  CV:  No pain, palpitations, hypo/hypertension  Resp:  No dyspnea, cough, tachypnea, wheezing  GI:  No pain, No nausea, No vomiting, No diarrhea, No constipation, No fever,  No rectal bleeding, No tarry stools, No dysphagia,  :  No pain, bleeding, incontinence, nocturia  Neuro:  No weakness, tingling, memory problems  Psych:  No fatigue, insomnia, mood problems, depression  Endocrine:  No polyuria, polydipsia, cold/heat intolerance  Heme:  No petechiae, ecchymosis, easy bruisability  Skin:  No rash, tattoos, scars, edema      PHYSICAL EXAM:   Vital Signs:  Vital Signs Last 24 Hrs  T(C): 36.7 (13 Jul 2018 13:14), Max: 37 (12 Jul 2018 21:03)  T(F): 98.1 (13 Jul 2018 13:14), Max: 98.6 (12 Jul 2018 21:03)  HR: 74 (13 Jul 2018 13:14) (56 - 75)  BP: 134/62 (13 Jul 2018 13:14) (119/59 - 134/62)  BP(mean): --  RR: 20 (13 Jul 2018 13:14) (18 - 20)  SpO2: --  Daily     Daily     GENERAL:  Appears stated age, well-groomed, well-nourished, no distress  HEENT:  NC/AT,  conjunctivae clear and pink, no thyromegaly, nodules, adenopathy, no JVD, sclera + icterus  CHEST:  Full & symmetric excursion, no increased effort, breath sounds clear  HEART:  Regular rhythm, S1, S2, no murmur/rub/S3/S4, no abdominal bruit, no edema  ABDOMEN:  Soft, non-tender, non-distended, normoactive bowel sounds,  no masses ,no hepato-splenomegaly,   EXTEREMITIES:  no cyanosis,clubbing or edema  SKIN:  No rash/erythema/ecchymoses/petechiae/wounds/abscess/warm/dry  NEURO:  Alert, oriented, no asterixis, no tremor, no encephalopathy    LABS:                        12.7   6.24  )-----------( 212      ( 13 Jul 2018 04:41 )             38.2     07-13    141  |  104  |  18  ----------------------------<  113<H>  4.5   |  25  |  0.8    Ca    8.7      13 Jul 2018 04:41    TPro  6.0  /  Alb  3.6  /  TBili  2.5<H>  /  DBili  1.7<H>  /  AST  198<H>  /  ALT  474<H>  /  AlkPhos  151<H>  07-13    LIVER FUNCTIONS - ( 13 Jul 2018 04:41 )  Alb: 3.6 g/dL / Pro: 6.0 g/dL / ALK PHOS: 151 U/L / ALT: 474 U/L / AST: 198 U/L / GGT: x           PT/INR - ( 13 Jul 2018 04:41 )   PT: 12.30 sec;   INR: 1.14 ratio         PTT - ( 13 Jul 2018 04:41 )  PTT:34.5 sec

## 2018-07-13 NOTE — PROGRESS NOTE ADULT - ASSESSMENT
38 y/o Obese M w/ no significant PMHx was sent by his Gastroenterologist by symptoms of jaundice and pruritis x3 weeks. Pt was found to have a  CBD dilation of 11mm on RUQ sono at his GI's office, picture suggestive of choledocholithiasis. MRI was done which showed choledocholithiasis, CBD dilatation and pancreatic divisum. no fever or abdominal pain at this point.     - Choledocholithiasis:  no evidence of cholangitis at this point.  Plan:  patient can be discharged and is scheduled to have ERCP as outpatient on Monday.  patient should be discharged on Po cipro   advance diet as tolerated to low fat diet. 36 y/o Obese M w/ no significant PMHx was sent by his Gastroenterologist by symptoms of jaundice and pruritis x3 weeks. Pt was found to have a  CBD dilation of 11mm on RUQ sono at his GI's office, picture suggestive of choledocholithiasis. MRI was done which showed choledocholithiasis, CBD dilatation and pancreatic divisum. no fever or abdominal pain at this point.     - Choledocholithiasis:  no evidence of cholangitis at this point, Jaundice is resolving not related to acute hepatitis  Plan:  patient can be discharged and is scheduled to have ERCP as outpatient on Monday.  patient should be discharged on Po cipro   advance diet as tolerated to low fat diet.  Instructed to returned to ER with any issues

## 2018-07-13 NOTE — DISCHARGE NOTE ADULT - PATIENT PORTAL LINK FT
You can access the [facility] Patient Portal, offered by [facility], by registering with the following website: [email or web address]

## 2018-07-13 NOTE — DISCHARGE NOTE ADULT - CARE PROVIDER_API CALL
Shahab Bal), Gastroenterology; Internal Medicine  [address] [place] [address]  Phone: [phone]  Fax: [phone]

## 2018-07-14 LAB — CEA SERPL-MCNC: 1.2 NG/ML — SIGNIFICANT CHANGE UP (ref 0–3.8)

## 2018-07-16 ENCOUNTER — OUTPATIENT (OUTPATIENT)
Dept: OUTPATIENT SERVICES | Facility: HOSPITAL | Age: 38
LOS: 1 days | Discharge: HOME | End: 2018-07-16

## 2018-07-16 VITALS — RESPIRATION RATE: 18 BRPM | HEART RATE: 77 BPM | SYSTOLIC BLOOD PRESSURE: 132 MMHG | DIASTOLIC BLOOD PRESSURE: 64 MMHG

## 2018-07-16 VITALS
WEIGHT: 259.93 LBS | HEART RATE: 72 BPM | TEMPERATURE: 98 F | SYSTOLIC BLOOD PRESSURE: 150 MMHG | RESPIRATION RATE: 18 BRPM | HEIGHT: 70 IN | DIASTOLIC BLOOD PRESSURE: 82 MMHG

## 2018-07-16 DIAGNOSIS — Z98.890 OTHER SPECIFIED POSTPROCEDURAL STATES: Chronic | ICD-10-CM

## 2018-07-16 NOTE — H&P ADULT - NSHPPHYSICALEXAM_GEN_ALL_CORE
PHYSICAL EXAM:   Vital Signs:  Vital Signs Last 24 Hrs  T(C): 36.5 (16 Jul 2018 11:19), Max: 36.5 (16 Jul 2018 11:19)  T(F): 97.7 (16 Jul 2018 11:19), Max: 97.7 (16 Jul 2018 11:19)  HR: 72 (16 Jul 2018 11:19) (72 - 72)  BP: 150/82 (16 Jul 2018 11:19) (150/82 - 150/82)  BP(mean): --  RR: 18 (16 Jul 2018 11:19) (18 - 18)  SpO2: --  Daily Height in cm: 177.8 (16 Jul 2018 11:19)    Daily     GENERAL:  Appears stated age, well-groomed, well-nourished, no distress  HEENT:  NC/AT,  conjunctivae clear and pink, no thyromegaly, nodules, adenopathy, no JVD, sclera -anicteric  CHEST:  Full & symmetric excursion, no increased effort, breath sounds clear  HEART:  Regular rhythm, S1, S2, no murmur/rub/S3/S4, no abdominal bruit, no edema  ABDOMEN:  Soft, non-tender, non-distended, normoactive bowel sounds,  no masses ,no hepato-splenomegaly, no signs of chronic liver disease  EXTEREMITIES:  no cyanosis,clubbing or edema  SKIN:  No rash/erythema/ecchymoses/petechiae/wounds/abscess/warm/dry  NEURO:  Alert, oriented, no asterixis, no tremor, no encephalopathy

## 2018-07-16 NOTE — ASU DISCHARGE PLAN (ADULT/PEDIATRIC). - NOTIFY
Pain not relieved by Medications/Fever greater than 101/Excessive Diarrhea/Persistent Nausea and Vomiting/Bleeding that does not stop/Inability to Tolerate Liquids or Foods

## 2018-07-19 DIAGNOSIS — Z88.0 ALLERGY STATUS TO PENICILLIN: ICD-10-CM

## 2018-07-19 DIAGNOSIS — R17 UNSPECIFIED JAUNDICE: ICD-10-CM

## 2018-07-19 DIAGNOSIS — Z88.1 ALLERGY STATUS TO OTHER ANTIBIOTIC AGENTS STATUS: ICD-10-CM

## 2018-07-19 DIAGNOSIS — Z80.3 FAMILY HISTORY OF MALIGNANT NEOPLASM OF BREAST: ICD-10-CM

## 2018-07-19 DIAGNOSIS — Q45.3 OTHER CONGENITAL MALFORMATIONS OF PANCREAS AND PANCREATIC DUCT: ICD-10-CM

## 2018-07-19 DIAGNOSIS — E66.9 OBESITY, UNSPECIFIED: ICD-10-CM

## 2018-07-19 DIAGNOSIS — K80.71 CALCULUS OF GALLBLADDER AND BILE DUCT WITHOUT CHOLECYSTITIS WITH OBSTRUCTION: ICD-10-CM

## 2018-07-19 DIAGNOSIS — Z88.4 ALLERGY STATUS TO ANESTHETIC AGENT: ICD-10-CM

## 2018-07-19 DIAGNOSIS — K80.50 CALCULUS OF BILE DUCT WITHOUT CHOLANGITIS OR CHOLECYSTITIS WITHOUT OBSTRUCTION: ICD-10-CM

## 2018-07-19 DIAGNOSIS — Z83.3 FAMILY HISTORY OF DIABETES MELLITUS: ICD-10-CM

## 2018-07-24 DIAGNOSIS — Z53.8 PROCEDURE AND TREATMENT NOT CARRIED OUT FOR OTHER REASONS: ICD-10-CM

## 2018-08-04 ENCOUNTER — INPATIENT (INPATIENT)
Facility: HOSPITAL | Age: 38
LOS: 1 days | Discharge: HOME | End: 2018-08-06
Attending: SURGERY | Admitting: SURGERY

## 2018-08-04 VITALS
HEART RATE: 91 BPM | OXYGEN SATURATION: 100 % | SYSTOLIC BLOOD PRESSURE: 134 MMHG | TEMPERATURE: 96 F | WEIGHT: 259.93 LBS | RESPIRATION RATE: 19 BRPM | DIASTOLIC BLOOD PRESSURE: 73 MMHG | HEIGHT: 72 IN

## 2018-08-04 DIAGNOSIS — Z98.890 OTHER SPECIFIED POSTPROCEDURAL STATES: Chronic | ICD-10-CM

## 2018-08-04 DIAGNOSIS — Z98.890 OTHER SPECIFIED POSTPROCEDURAL STATES: ICD-10-CM

## 2018-08-04 DIAGNOSIS — Z88.8 ALLERGY STATUS TO OTHER DRUGS, MEDICAMENTS AND BIOLOGICAL SUBSTANCES STATUS: ICD-10-CM

## 2018-08-04 DIAGNOSIS — J45.901 UNSPECIFIED ASTHMA WITH (ACUTE) EXACERBATION: ICD-10-CM

## 2018-08-04 DIAGNOSIS — K81.0 ACUTE CHOLECYSTITIS: ICD-10-CM

## 2018-08-04 DIAGNOSIS — Z87.442 PERSONAL HISTORY OF URINARY CALCULI: ICD-10-CM

## 2018-08-04 DIAGNOSIS — Z88.0 ALLERGY STATUS TO PENICILLIN: ICD-10-CM

## 2018-08-04 DIAGNOSIS — E66.9 OBESITY, UNSPECIFIED: ICD-10-CM

## 2018-08-04 LAB
ALBUMIN SERPL ELPH-MCNC: 4.6 G/DL — SIGNIFICANT CHANGE UP (ref 3.5–5.2)
ALP SERPL-CCNC: 109 U/L — SIGNIFICANT CHANGE UP (ref 30–115)
ALT FLD-CCNC: 28 U/L — SIGNIFICANT CHANGE UP (ref 0–41)
ANION GAP SERPL CALC-SCNC: 15 MMOL/L — HIGH (ref 7–14)
AST SERPL-CCNC: 20 U/L — SIGNIFICANT CHANGE UP (ref 0–41)
BASOPHILS # BLD AUTO: 0.04 K/UL — SIGNIFICANT CHANGE UP (ref 0–0.2)
BASOPHILS NFR BLD AUTO: 0.2 % — SIGNIFICANT CHANGE UP (ref 0–1)
BILIRUB DIRECT SERPL-MCNC: 0.3 MG/DL — HIGH (ref 0–0.2)
BILIRUB INDIRECT FLD-MCNC: 0.5 MG/DL — SIGNIFICANT CHANGE UP (ref 0.2–1.2)
BILIRUB SERPL-MCNC: 0.8 MG/DL — SIGNIFICANT CHANGE UP (ref 0.2–1.2)
BLD GP AB SCN SERPL QL: SIGNIFICANT CHANGE UP
BUN SERPL-MCNC: 17 MG/DL — SIGNIFICANT CHANGE UP (ref 10–20)
CALCIUM SERPL-MCNC: 9.6 MG/DL — SIGNIFICANT CHANGE UP (ref 8.5–10.1)
CHLORIDE SERPL-SCNC: 100 MMOL/L — SIGNIFICANT CHANGE UP (ref 98–110)
CO2 SERPL-SCNC: 24 MMOL/L — SIGNIFICANT CHANGE UP (ref 17–32)
CREAT SERPL-MCNC: 0.9 MG/DL — SIGNIFICANT CHANGE UP (ref 0.7–1.5)
EOSINOPHIL # BLD AUTO: 0.03 K/UL — SIGNIFICANT CHANGE UP (ref 0–0.7)
EOSINOPHIL NFR BLD AUTO: 0.1 % — SIGNIFICANT CHANGE UP (ref 0–8)
GLUCOSE SERPL-MCNC: 154 MG/DL — HIGH (ref 70–99)
HCT VFR BLD CALC: 41.7 % — LOW (ref 42–52)
HGB BLD-MCNC: 14 G/DL — SIGNIFICANT CHANGE UP (ref 14–18)
IMM GRANULOCYTES NFR BLD AUTO: 0.5 % — HIGH (ref 0.1–0.3)
LACTATE SERPL-SCNC: 2.1 MMOL/L — SIGNIFICANT CHANGE UP (ref 0.5–2.2)
LIDOCAIN IGE QN: 30 U/L — SIGNIFICANT CHANGE UP (ref 7–60)
LYMPHOCYTES # BLD AUTO: 1.01 K/UL — LOW (ref 1.2–3.4)
LYMPHOCYTES # BLD AUTO: 4.8 % — LOW (ref 20.5–51.1)
MCHC RBC-ENTMCNC: 27.8 PG — SIGNIFICANT CHANGE UP (ref 27–31)
MCHC RBC-ENTMCNC: 33.6 G/DL — SIGNIFICANT CHANGE UP (ref 32–37)
MCV RBC AUTO: 82.9 FL — SIGNIFICANT CHANGE UP (ref 80–94)
MONOCYTES # BLD AUTO: 1.47 K/UL — HIGH (ref 0.1–0.6)
MONOCYTES NFR BLD AUTO: 6.9 % — SIGNIFICANT CHANGE UP (ref 1.7–9.3)
NEUTROPHILS # BLD AUTO: 18.56 K/UL — HIGH (ref 1.4–6.5)
NEUTROPHILS NFR BLD AUTO: 87.5 % — HIGH (ref 42.2–75.2)
NRBC # BLD: 0 /100 WBCS — SIGNIFICANT CHANGE UP (ref 0–0)
PLATELET # BLD AUTO: 233 K/UL — SIGNIFICANT CHANGE UP (ref 130–400)
POTASSIUM SERPL-MCNC: 5 MMOL/L — SIGNIFICANT CHANGE UP (ref 3.5–5)
POTASSIUM SERPL-SCNC: 5 MMOL/L — SIGNIFICANT CHANGE UP (ref 3.5–5)
PROT SERPL-MCNC: 7.4 G/DL — SIGNIFICANT CHANGE UP (ref 6–8)
RBC # BLD: 5.03 M/UL — SIGNIFICANT CHANGE UP (ref 4.7–6.1)
RBC # FLD: 13.3 % — SIGNIFICANT CHANGE UP (ref 11.5–14.5)
SODIUM SERPL-SCNC: 139 MMOL/L — SIGNIFICANT CHANGE UP (ref 135–146)
TYPE + AB SCN PNL BLD: SIGNIFICANT CHANGE UP
WBC # BLD: 21.21 K/UL — HIGH (ref 4.8–10.8)
WBC # FLD AUTO: 21.21 K/UL — HIGH (ref 4.8–10.8)

## 2018-08-04 RX ORDER — CIPROFLOXACIN LACTATE 400MG/40ML
400 VIAL (ML) INTRAVENOUS ONCE
Qty: 0 | Refills: 0 | Status: COMPLETED | OUTPATIENT
Start: 2018-08-04 | End: 2018-08-04

## 2018-08-04 RX ORDER — METRONIDAZOLE 500 MG
500 TABLET ORAL EVERY 8 HOURS
Qty: 0 | Refills: 0 | Status: DISCONTINUED | OUTPATIENT
Start: 2018-08-04 | End: 2018-08-06

## 2018-08-04 RX ORDER — CIPROFLOXACIN LACTATE 400MG/40ML
VIAL (ML) INTRAVENOUS
Qty: 0 | Refills: 0 | Status: DISCONTINUED | OUTPATIENT
Start: 2018-08-04 | End: 2018-08-04

## 2018-08-04 RX ORDER — PANTOPRAZOLE SODIUM 20 MG/1
40 TABLET, DELAYED RELEASE ORAL
Qty: 0 | Refills: 0 | Status: DISCONTINUED | OUTPATIENT
Start: 2018-08-04 | End: 2018-08-04

## 2018-08-04 RX ORDER — SODIUM CHLORIDE 9 MG/ML
1000 INJECTION, SOLUTION INTRAVENOUS
Qty: 0 | Refills: 0 | Status: DISCONTINUED | OUTPATIENT
Start: 2018-08-04 | End: 2018-08-04

## 2018-08-04 RX ORDER — MORPHINE SULFATE 50 MG/1
2 CAPSULE, EXTENDED RELEASE ORAL EVERY 6 HOURS
Qty: 0 | Refills: 0 | Status: DISCONTINUED | OUTPATIENT
Start: 2018-08-04 | End: 2018-08-05

## 2018-08-04 RX ORDER — ACETAMINOPHEN 500 MG
650 TABLET ORAL ONCE
Qty: 0 | Refills: 0 | Status: COMPLETED | OUTPATIENT
Start: 2018-08-04 | End: 2018-08-04

## 2018-08-04 RX ORDER — SODIUM CHLORIDE 9 MG/ML
1000 INJECTION INTRAMUSCULAR; INTRAVENOUS; SUBCUTANEOUS
Qty: 0 | Refills: 0 | Status: DISCONTINUED | OUTPATIENT
Start: 2018-08-04 | End: 2018-08-04

## 2018-08-04 RX ORDER — METRONIDAZOLE 500 MG
TABLET ORAL
Qty: 0 | Refills: 0 | Status: DISCONTINUED | OUTPATIENT
Start: 2018-08-04 | End: 2018-08-04

## 2018-08-04 RX ORDER — ONDANSETRON 8 MG/1
4 TABLET, FILM COATED ORAL ONCE
Qty: 0 | Refills: 0 | Status: DISCONTINUED | OUTPATIENT
Start: 2018-08-04 | End: 2018-08-04

## 2018-08-04 RX ORDER — SODIUM CHLORIDE 9 MG/ML
1000 INJECTION INTRAMUSCULAR; INTRAVENOUS; SUBCUTANEOUS ONCE
Qty: 0 | Refills: 0 | Status: COMPLETED | OUTPATIENT
Start: 2018-08-04 | End: 2018-08-04

## 2018-08-04 RX ORDER — METRONIDAZOLE 500 MG
500 TABLET ORAL ONCE
Qty: 0 | Refills: 0 | Status: COMPLETED | OUTPATIENT
Start: 2018-08-04 | End: 2018-08-04

## 2018-08-04 RX ORDER — MORPHINE SULFATE 50 MG/1
4 CAPSULE, EXTENDED RELEASE ORAL EVERY 6 HOURS
Qty: 0 | Refills: 0 | Status: DISCONTINUED | OUTPATIENT
Start: 2018-08-04 | End: 2018-08-05

## 2018-08-04 RX ORDER — CIPROFLOXACIN LACTATE 400MG/40ML
400 VIAL (ML) INTRAVENOUS EVERY 12 HOURS
Qty: 0 | Refills: 0 | Status: DISCONTINUED | OUTPATIENT
Start: 2018-08-04 | End: 2018-08-06

## 2018-08-04 RX ORDER — ACETAMINOPHEN 500 MG
650 TABLET ORAL EVERY 6 HOURS
Qty: 0 | Refills: 0 | Status: DISCONTINUED | OUTPATIENT
Start: 2018-08-04 | End: 2018-08-06

## 2018-08-04 RX ORDER — HEPARIN SODIUM 5000 [USP'U]/ML
5000 INJECTION INTRAVENOUS; SUBCUTANEOUS EVERY 8 HOURS
Qty: 0 | Refills: 0 | Status: DISCONTINUED | OUTPATIENT
Start: 2018-08-04 | End: 2018-08-04

## 2018-08-04 RX ORDER — HEPARIN SODIUM 5000 [USP'U]/ML
5000 INJECTION INTRAVENOUS; SUBCUTANEOUS EVERY 8 HOURS
Qty: 0 | Refills: 0 | Status: DISCONTINUED | OUTPATIENT
Start: 2018-08-04 | End: 2018-08-06

## 2018-08-04 RX ORDER — METRONIDAZOLE 500 MG
500 TABLET ORAL EVERY 8 HOURS
Qty: 0 | Refills: 0 | Status: DISCONTINUED | OUTPATIENT
Start: 2018-08-04 | End: 2018-08-04

## 2018-08-04 RX ORDER — PANTOPRAZOLE SODIUM 20 MG/1
40 TABLET, DELAYED RELEASE ORAL
Qty: 0 | Refills: 0 | Status: DISCONTINUED | OUTPATIENT
Start: 2018-08-04 | End: 2018-08-06

## 2018-08-04 RX ORDER — MORPHINE SULFATE 50 MG/1
4 CAPSULE, EXTENDED RELEASE ORAL ONCE
Qty: 0 | Refills: 0 | Status: DISCONTINUED | OUTPATIENT
Start: 2018-08-04 | End: 2018-08-04

## 2018-08-04 RX ORDER — SODIUM CHLORIDE 9 MG/ML
1000 INJECTION INTRAMUSCULAR; INTRAVENOUS; SUBCUTANEOUS
Qty: 0 | Refills: 0 | Status: DISCONTINUED | OUTPATIENT
Start: 2018-08-04 | End: 2018-08-05

## 2018-08-04 RX ORDER — CIPROFLOXACIN LACTATE 400MG/40ML
400 VIAL (ML) INTRAVENOUS EVERY 12 HOURS
Qty: 0 | Refills: 0 | Status: CANCELLED | OUTPATIENT
Start: 2018-08-05 | End: 2018-08-04

## 2018-08-04 RX ORDER — HYDROMORPHONE HYDROCHLORIDE 2 MG/ML
1 INJECTION INTRAMUSCULAR; INTRAVENOUS; SUBCUTANEOUS
Qty: 0 | Refills: 0 | Status: DISCONTINUED | OUTPATIENT
Start: 2018-08-04 | End: 2018-08-04

## 2018-08-04 RX ADMIN — Medication 100 MILLIGRAM(S): at 09:22

## 2018-08-04 RX ADMIN — SODIUM CHLORIDE 100 MILLILITER(S): 9 INJECTION INTRAMUSCULAR; INTRAVENOUS; SUBCUTANEOUS at 16:29

## 2018-08-04 RX ADMIN — MORPHINE SULFATE 4 MILLIGRAM(S): 50 CAPSULE, EXTENDED RELEASE ORAL at 23:53

## 2018-08-04 RX ADMIN — SODIUM CHLORIDE 100 MILLILITER(S): 9 INJECTION INTRAMUSCULAR; INTRAVENOUS; SUBCUTANEOUS at 21:01

## 2018-08-04 RX ADMIN — HEPARIN SODIUM 5000 UNIT(S): 5000 INJECTION INTRAVENOUS; SUBCUTANEOUS at 21:32

## 2018-08-04 RX ADMIN — MORPHINE SULFATE 4 MILLIGRAM(S): 50 CAPSULE, EXTENDED RELEASE ORAL at 08:04

## 2018-08-04 RX ADMIN — MORPHINE SULFATE 4 MILLIGRAM(S): 50 CAPSULE, EXTENDED RELEASE ORAL at 23:36

## 2018-08-04 RX ADMIN — SODIUM CHLORIDE 2000 MILLILITER(S): 9 INJECTION INTRAMUSCULAR; INTRAVENOUS; SUBCUTANEOUS at 08:04

## 2018-08-04 RX ADMIN — Medication 100 MILLIGRAM(S): at 23:03

## 2018-08-04 RX ADMIN — Medication 650 MILLIGRAM(S): at 13:28

## 2018-08-04 RX ADMIN — Medication 200 MILLIGRAM(S): at 21:32

## 2018-08-04 NOTE — H&P ADULT - NSHPPHYSICALEXAM_GEN_ALL_CORE
General: NAD, AAOx3, laying comfortably in bed.   Eyes: Scleras clear, PERRLA/ EOMI, Gross vision intact  ENT :Gross hearing intact, grossly patent pharynx, no stridor  Neck: Neck supple, trachea midline, No JVD,   Respiratory: CTA B/L, No wheezing, rales, rhonchi  CV: RRR, S1S2, no murmurs, rubs or gallops  Abdominal: Obese, soft, tenderness to palpation over RUQ, negative murphys sign, nondistened, positive bowel sounds.    Extremities: No edema, + peripheral pulses  Skin: No Rashes, Hematoma, Ecchymosis

## 2018-08-04 NOTE — ED ADULT NURSE NOTE - NSIMPLEMENTINTERV_GEN_ALL_ED
Implemented All Universal Safety Interventions:  [place] to call system. Call bell, personal items and telephone within reach. Instruct patient to call for assistance. Room bathroom lighting operational. Non-slip footwear when patient is off stretcher. Physically safe environment: no spills, clutter or unnecessary equipment. Stretcher in lowest position, wheels locked, appropriate side rails in place.

## 2018-08-04 NOTE — H&P ADULT - HISTORY OF PRESENT ILLNESS
37M PMHx of choledocholithiasis s/p ERCP 3weeks ago, presents to the ED wit abdominal pain located in the RUQ since thursday. Pt states the abdominal pain is constant and has associated nausea and vomiting. Pt states he was told after the ERCP that he would have to get his gallbladder removed, and to come to the ED if he experiences abdominal pain. Pt denies any fevers, chills, CP, SOB, diarrhea, urinary symptoms.

## 2018-08-04 NOTE — H&P ADULT - ASSESSMENT
37M PMHx of choledocholithiasis s/p ERCP 3weeks ago, presents to the ED wit abdominal pain located in the RUQ since thursday. US shows cholelithiasis , sludge and wall thickening, common bile duct dilatation. WBC is 21.21, patient is afebrile.   - keep NPO 37M PMHx of choledocholithiasis s/p ERCP 3weeks ago, presents to the ED wit abdominal pain located in the RUQ since thursday. US shows cholelithiasis , sludge and wall thickening, common bile duct dilatation. WBC is 21.21, patient is afebrile.   - Admit to general surgery   - NPO  - IVF  - IV ABX (cipro/flagyl)  - HSQ/PTX  - f/u labs and LFTs  - Zofran

## 2018-08-04 NOTE — ED PROVIDER NOTE - PHYSICAL EXAMINATION
Vital Signs: I have reviewed the initial vital signs.  Constitutional: NAD, well-nourished, appears stated age, no acute distress.  HEENT: Airway patent, moist MM, no erythema/swelling/deformity of oral structures. EOMI, PERRLA.  CV: regular rate, regular rhythm, well-perfused extremities, 2+ b/l DP and radial pulses equal.  Lungs: BCTA, no increased WOB.  ABD: (+) RUQ abdominal pain, no guarding or rebound, no pulsatile mass, no hernias.   MSK: Neck supple, nontender, nl ROM, no stepoff. Chest nontender. Back nontender in TLS spine or to b/l bony structures or flanks. Ext nontender, nl rom, no deformity.   INTEG: Skin warm, dry, no rash.  NEURO: A&Ox3, CN II-XII intact, normal strength 5/5 all 4 ext, nl sensation throughout, normal speech and coordination.  PSYCH: Calm, cooperative, normal affect and interaction.

## 2018-08-04 NOTE — H&P ADULT - NSHPLABSRESULTS_GEN_ALL_CORE
14.0   21.21 )-----------( 233      ( 08-04 @ 07:09 )             41.7                    139   |  100   |  17                 Ca: 9.6    BMP:   ----------------------------< 154    Mg: x     (08-04-18 @ 07:09)             5.0    |  24    | 0.9                Ph: x        LFT:     TPro: 7.4 / Alb: 4.6 / TBili: 0.8 / DBili: 0.3 / AST: 20 / ALT: 28 / AlkPhos: 109   (08-04-18 @ 07:09)          < from: US Abdomen Limited (08.04.18 @ 08:15) >      IMPRESSION:    1.  Cholelithiasis, sludge and wall thickening. The absence of a positive   sonographic Avila's sign prevents sonographic diagnosis of cholecystitis.    2.  Common bile duct dilatation..    < end of copied text >

## 2018-08-04 NOTE — ED PROVIDER NOTE - OBJECTIVE STATEMENT
37 year old male with hx choledocholithiasis s/p ERCP 3 weeks ago, presenting with a 1 day history of recurrent RUQ abdominal pain associated with nausea and vomiting. States he was told to return to the ER for these symptoms. Denies fevers, chest pain, dyspnea, palpitations, diarrhea, blood in stool/dark stools, urinary symptoms or leg swelling.

## 2018-08-04 NOTE — ED ADULT NURSE NOTE - OBJECTIVE STATEMENT
Patient c/o of abdominal pain. Patient states he was d/c'd from the hospital several weeks ago with gallstones, told to f/u with GI.

## 2018-08-04 NOTE — ED PROVIDER NOTE - PROGRESS NOTE DETAILS
Patient seen and evaluated by me. Labs/imaging ordered. Started on 1L NS IV. Patient states he does not want pain medication at this time. Case turned over to Dr. Topete pending RUQ US and labs ERCP done by Dr. Alicia, patient currently c/o of pain. Surgery Dr. Viveros aware of consult

## 2018-08-04 NOTE — BRIEF OPERATIVE NOTE - PROCEDURE
<<-----Click on this checkbox to enter Procedure Laparoscopic cholecystectomy  08/04/2018    Active  APAL1

## 2018-08-05 LAB
ANION GAP SERPL CALC-SCNC: 13 MMOL/L — SIGNIFICANT CHANGE UP (ref 7–14)
APTT BLD: 29.5 SEC — SIGNIFICANT CHANGE UP (ref 27–39.2)
BASE EXCESS BLDA CALC-SCNC: -0.1 MMOL/L — SIGNIFICANT CHANGE UP (ref -2–2)
BASOPHILS # BLD AUTO: 0.01 K/UL — SIGNIFICANT CHANGE UP (ref 0–0.2)
BASOPHILS NFR BLD AUTO: 0.1 % — SIGNIFICANT CHANGE UP (ref 0–1)
BUN SERPL-MCNC: 15 MG/DL — SIGNIFICANT CHANGE UP (ref 10–20)
CALCIUM SERPL-MCNC: 8.5 MG/DL — SIGNIFICANT CHANGE UP (ref 8.5–10.1)
CHLORIDE SERPL-SCNC: 100 MMOL/L — SIGNIFICANT CHANGE UP (ref 98–110)
CK SERPL-CCNC: 84 U/L — SIGNIFICANT CHANGE UP (ref 0–225)
CO2 SERPL-SCNC: 24 MMOL/L — SIGNIFICANT CHANGE UP (ref 17–32)
CREAT SERPL-MCNC: 1.1 MG/DL — SIGNIFICANT CHANGE UP (ref 0.7–1.5)
EOSINOPHIL # BLD AUTO: 0 K/UL — SIGNIFICANT CHANGE UP (ref 0–0.7)
EOSINOPHIL NFR BLD AUTO: 0 % — SIGNIFICANT CHANGE UP (ref 0–8)
GLUCOSE SERPL-MCNC: 144 MG/DL — HIGH (ref 70–99)
HCO3 BLDA-SCNC: 24 MMOL/L — SIGNIFICANT CHANGE UP (ref 23–27)
HCT VFR BLD CALC: 33.7 % — LOW (ref 42–52)
HGB BLD-MCNC: 11.6 G/DL — LOW (ref 14–18)
HOROWITZ INDEX BLDA+IHG-RTO: 28 — SIGNIFICANT CHANGE UP
IMM GRANULOCYTES NFR BLD AUTO: 0.5 % — HIGH (ref 0.1–0.3)
INR BLD: 1.66 RATIO — HIGH (ref 0.65–1.3)
LYMPHOCYTES # BLD AUTO: 0.98 K/UL — LOW (ref 1.2–3.4)
LYMPHOCYTES # BLD AUTO: 7.9 % — LOW (ref 20.5–51.1)
MAGNESIUM SERPL-MCNC: 1.6 MG/DL — LOW (ref 1.8–2.4)
MAGNESIUM SERPL-MCNC: 1.8 MG/DL — SIGNIFICANT CHANGE UP (ref 1.8–2.4)
MCHC RBC-ENTMCNC: 28.8 PG — SIGNIFICANT CHANGE UP (ref 27–31)
MCHC RBC-ENTMCNC: 34.4 G/DL — SIGNIFICANT CHANGE UP (ref 32–37)
MCV RBC AUTO: 83.6 FL — SIGNIFICANT CHANGE UP (ref 80–94)
MONOCYTES # BLD AUTO: 1.23 K/UL — HIGH (ref 0.1–0.6)
MONOCYTES NFR BLD AUTO: 9.9 % — HIGH (ref 1.7–9.3)
NEUTROPHILS # BLD AUTO: 10.1 K/UL — HIGH (ref 1.4–6.5)
NEUTROPHILS NFR BLD AUTO: 81.6 % — HIGH (ref 42.2–75.2)
NRBC # BLD: 0 /100 WBCS — SIGNIFICANT CHANGE UP (ref 0–0)
PCO2 BLDA: 34 MMHG — LOW (ref 38–42)
PH BLDA: 7.45 — HIGH (ref 7.38–7.42)
PHOSPHATE SERPL-MCNC: 2.7 MG/DL — SIGNIFICANT CHANGE UP (ref 2.1–4.9)
PLATELET # BLD AUTO: 174 K/UL — SIGNIFICANT CHANGE UP (ref 130–400)
PO2 BLDA: 101 MMHG — HIGH (ref 78–95)
POTASSIUM SERPL-MCNC: 4.7 MMOL/L — SIGNIFICANT CHANGE UP (ref 3.5–5)
POTASSIUM SERPL-SCNC: 4.7 MMOL/L — SIGNIFICANT CHANGE UP (ref 3.5–5)
PROTHROM AB SERPL-ACNC: 17.8 SEC — HIGH (ref 9.95–12.87)
RBC # BLD: 4.03 M/UL — LOW (ref 4.7–6.1)
RBC # FLD: 13.4 % — SIGNIFICANT CHANGE UP (ref 11.5–14.5)
SAO2 % BLDA: 99 % — HIGH (ref 94–98)
SODIUM SERPL-SCNC: 137 MMOL/L — SIGNIFICANT CHANGE UP (ref 135–146)
TROPONIN T SERPL-MCNC: <0.01 NG/ML — SIGNIFICANT CHANGE UP
WBC # BLD: 12.38 K/UL — HIGH (ref 4.8–10.8)
WBC # FLD AUTO: 12.38 K/UL — HIGH (ref 4.8–10.8)

## 2018-08-05 RX ORDER — SODIUM CHLORIDE 9 MG/ML
1000 INJECTION INTRAMUSCULAR; INTRAVENOUS; SUBCUTANEOUS
Qty: 0 | Refills: 0 | Status: DISCONTINUED | OUTPATIENT
Start: 2018-08-05 | End: 2018-08-05

## 2018-08-05 RX ORDER — ACETAMINOPHEN 500 MG
650 TABLET ORAL EVERY 6 HOURS
Qty: 0 | Refills: 0 | Status: DISCONTINUED | OUTPATIENT
Start: 2018-08-05 | End: 2018-08-05

## 2018-08-05 RX ORDER — OXYCODONE AND ACETAMINOPHEN 5; 325 MG/1; MG/1
2 TABLET ORAL EVERY 4 HOURS
Qty: 0 | Refills: 0 | Status: DISCONTINUED | OUTPATIENT
Start: 2018-08-05 | End: 2018-08-06

## 2018-08-05 RX ORDER — MAGNESIUM SULFATE 500 MG/ML
2 VIAL (ML) INJECTION ONCE
Qty: 0 | Refills: 0 | Status: COMPLETED | OUTPATIENT
Start: 2018-08-05 | End: 2018-08-05

## 2018-08-05 RX ORDER — KETOROLAC TROMETHAMINE 30 MG/ML
15 SYRINGE (ML) INJECTION EVERY 6 HOURS
Qty: 0 | Refills: 0 | Status: DISCONTINUED | OUTPATIENT
Start: 2018-08-05 | End: 2018-08-06

## 2018-08-05 RX ORDER — ALBUTEROL 90 UG/1
2.5 AEROSOL, METERED ORAL ONCE
Qty: 0 | Refills: 0 | Status: COMPLETED | OUTPATIENT
Start: 2018-08-05 | End: 2018-08-05

## 2018-08-05 RX ORDER — ACETAMINOPHEN 500 MG
650 TABLET ORAL EVERY 6 HOURS
Qty: 0 | Refills: 0 | Status: DISCONTINUED | OUTPATIENT
Start: 2018-08-05 | End: 2018-08-06

## 2018-08-05 RX ADMIN — HEPARIN SODIUM 5000 UNIT(S): 5000 INJECTION INTRAVENOUS; SUBCUTANEOUS at 05:40

## 2018-08-05 RX ADMIN — Medication 100 MILLIGRAM(S): at 13:45

## 2018-08-05 RX ADMIN — Medication 15 MILLIGRAM(S): at 13:37

## 2018-08-05 RX ADMIN — Medication 15 MILLIGRAM(S): at 13:55

## 2018-08-05 RX ADMIN — Medication 15 MILLIGRAM(S): at 21:05

## 2018-08-05 RX ADMIN — ALBUTEROL 2.5 MILLIGRAM(S): 90 AEROSOL, METERED ORAL at 16:15

## 2018-08-05 RX ADMIN — Medication 50 GRAM(S): at 21:58

## 2018-08-05 RX ADMIN — Medication 100 MILLIGRAM(S): at 05:38

## 2018-08-05 RX ADMIN — Medication 200 MILLIGRAM(S): at 05:40

## 2018-08-05 RX ADMIN — Medication 100 MILLIGRAM(S): at 21:06

## 2018-08-05 RX ADMIN — OXYCODONE AND ACETAMINOPHEN 2 TABLET(S): 5; 325 TABLET ORAL at 21:05

## 2018-08-05 RX ADMIN — OXYCODONE AND ACETAMINOPHEN 2 TABLET(S): 5; 325 TABLET ORAL at 15:10

## 2018-08-05 RX ADMIN — Medication 200 MILLIGRAM(S): at 18:05

## 2018-08-05 RX ADMIN — MORPHINE SULFATE 4 MILLIGRAM(S): 50 CAPSULE, EXTENDED RELEASE ORAL at 05:51

## 2018-08-05 RX ADMIN — OXYCODONE AND ACETAMINOPHEN 2 TABLET(S): 5; 325 TABLET ORAL at 21:40

## 2018-08-05 RX ADMIN — MORPHINE SULFATE 4 MILLIGRAM(S): 50 CAPSULE, EXTENDED RELEASE ORAL at 05:40

## 2018-08-05 RX ADMIN — Medication 15 MILLIGRAM(S): at 21:40

## 2018-08-05 RX ADMIN — PANTOPRAZOLE SODIUM 40 MILLIGRAM(S): 20 TABLET, DELAYED RELEASE ORAL at 05:38

## 2018-08-05 RX ADMIN — OXYCODONE AND ACETAMINOPHEN 2 TABLET(S): 5; 325 TABLET ORAL at 14:40

## 2018-08-05 RX ADMIN — HEPARIN SODIUM 5000 UNIT(S): 5000 INJECTION INTRAVENOUS; SUBCUTANEOUS at 13:46

## 2018-08-05 RX ADMIN — SODIUM CHLORIDE 75 MILLILITER(S): 9 INJECTION INTRAMUSCULAR; INTRAVENOUS; SUBCUTANEOUS at 18:06

## 2018-08-05 RX ADMIN — HEPARIN SODIUM 5000 UNIT(S): 5000 INJECTION INTRAVENOUS; SUBCUTANEOUS at 21:06

## 2018-08-05 NOTE — CHART NOTE - NSCHARTNOTEFT_GEN_A_CORE
SURGERY POST-OP NOTE:    S: Patient underwent lap darrick  and tolerated procedure without issue and sent to PACU. Patient denies chest pain, shortness of breath, nausea, vomiting, lightheadedness, or dizziness. Patient complains of poorly controlled pain, has not asked for pain medications. Encouraged to request pain meds.    O:  T(C): 36.8 (08-04-18 @ 22:24), Max: 37.1 (08-04-18 @ 17:20)  HR: 79 (08-04-18 @ 22:24) (70 - 96)  BP: 122/58 (08-04-18 @ 22:24) (111/57 - 136/76)  RR: 18 (08-04-18 @ 22:24) (12 - 20)  SpO2: 96% (08-04-18 @ 22:00) (95% - 98%)  Wt(kg): --                        14.0   21.21 )-----------( 233      ( 04 Aug 2018 07:09 )             41.7        08-04    139  |  100  |  17  ----------------------------<  154<H>  5.0   |  24  |  0.9    Ca    9.6      04 Aug 2018 07:09      Gen: NAD  Resp: Non-labored respirations  Abd: Soft, mildly tender, four port sites with gauze and tegaderm in place, clean, dry, intact    Assessment/Plan:  37y Male now POD#0 s/p Laparoscopic cholecystectomy      - Pain control  - ADAT  - DVT PPX  - Out of bed and encourage early ambulation  - Incentive spirometry    Dispo: floor, then likely home Sunday

## 2018-08-05 NOTE — CHART NOTE - NSCHARTNOTEFT_GEN_A_CORE
35yr/ m s/p Lap cholecsytectomy, complains of Shortness of breath since morning.  On evaluation: Patient was sweating and RR- 30/min, /80mmhg and HR-96bpm, SpO2-93% on room air. There  was decreased breath sounds on the left lower zone. No chest pain. Pain in surgical site was 8/10, he could pull approx 500ml on incentive spirometer. The patient also has a h/o asthma.  Senior on call Dr Kessler was called, he evaluated the patient.   Plan:  1.Continuous pulse oximetry  2.ABG  3.CXR   4.O2 via nasal cannula to maintain spo2 of 94%.  5.Albuterol nebulisation stat  6.EKG and Cardiac enzymes stat.  7.Adequate pain control  8.encourage ambulation and incentive spirometer.

## 2018-08-05 NOTE — PROGRESS NOTE ADULT - SUBJECTIVE AND OBJECTIVE BOX
GENERAL SURGERY PROGRESS NOTE     EDGARD BARRERA  [address] [place] [address] day :1d  POD: 0  Procedure: Laparoscopic cholecystectomy  Surgical Attending: Timbo Hill  Overnight events: No acute events since transfer to floor. Patient complaining of poorly controlled pain making it difficult to breathe. Encouraged him to request the previously ordered PRN pain medications. He and spouse at bedside will be trying to ambulate on the floor.    T(F): 98.3 (08-04-18 @ 22:24), Max: 101.1 (08-04-18 @ 13:16)  HR: 79 (08-04-18 @ 22:24) (70 - 96)  BP: 122/58 (08-04-18 @ 22:24) (111/57 - 136/76)  ABP: --  ABP(mean): --  RR: 18 (08-04-18 @ 22:24) (12 - 20)  SpO2: 96% (08-04-18 @ 22:00) (95% - 100%)      08-04-18 @ 07:01  -  08-05-18 @ 00:06  --------------------------------------------------------  IN:    sodium chloride 0.9%.: 300 mL  Total IN: 300 mL    OUT:  Total OUT: 0 mL    Total NET: 300 mL        DIET/FLUIDS: sodium chloride 0.9%. 1000 milliLiter(s) IV Continuous <Continuous>       GI proph:  pantoprazole    Tablet 40 milliGRAM(s) Oral before breakfast    AC/ proph: heparin  Injectable 5000 Unit(s) SubCutaneous every 8 hours    ABx: ciprofloxacin   IVPB 400 milliGRAM(s) IV Intermittent every 12 hours  metroNIDAZOLE  IVPB 500 milliGRAM(s) IV Intermittent every 8 hours      PHYSICAL EXAM:  GENERAL: NAD, well-appearing  CHEST/LUNG: Clear to auscultation bilaterally  HEART: Regular rate and rhythm  ABDOMEN: Soft, mildly tender, nondistended, four port sites with gauze, tegaderm in place, all clean, dry and intact      LABS  Labs:  CAPILLARY BLOOD GLUCOSE                              14.0   21.21 )-----------( 233      ( 04 Aug 2018 07:09 )             41.7       Auto Immature Granulocyte %: 0.5 % (08-04-18 @ 07:09)  Auto Neutrophil %: 87.5 % (08-04-18 @ 07:09)    08-04    139  |  100  |  17  ----------------------------<  154<H>  5.0   |  24  |  0.9      Calcium, Total Serum: 9.6 mg/dL (08-04-18 @ 07:09)      LFTs:             7.4  | 0.8  | 20       ------------------[109     ( 04 Aug 2018 07:09 )  4.6  | 0.3  | 28          Lipase:30     Amylase:x         Lactate, Blood: 2.1 mmol/L (08-04-18 @ 07:09)      Coags:                RADIOLOGY & ADDITIONAL TESTS:  < from: US Abdomen Limited (08.04.18 @ 08:15) >  IMPRESSION:    1.  Cholelithiasis, sludge and wall thickening. The absence of a positive   sonographic Avila's sign prevents sonographic diagnosis of cholecystitis.    2.  Common bile duct dilatation..    < end of copied text >      A/P:  EDGARD BARRERA is a 37yMale POD 0 from Laparoscopic cholecystectomy. He is currently progressing appropriately through milestones    Plan:   -if pain is well controlled by midday Sunday, and he is tolerated a regular diet, he can likely be discharged  -f/u AM labs  -encourage IS, ambulation

## 2018-08-06 VITALS
DIASTOLIC BLOOD PRESSURE: 58 MMHG | RESPIRATION RATE: 20 BRPM | HEART RATE: 83 BPM | SYSTOLIC BLOOD PRESSURE: 117 MMHG | TEMPERATURE: 97 F

## 2018-08-06 LAB
ANION GAP SERPL CALC-SCNC: 11 MMOL/L — SIGNIFICANT CHANGE UP (ref 7–14)
BASOPHILS # BLD AUTO: 0.02 K/UL — SIGNIFICANT CHANGE UP (ref 0–0.2)
BASOPHILS NFR BLD AUTO: 0.2 % — SIGNIFICANT CHANGE UP (ref 0–1)
BUN SERPL-MCNC: 20 MG/DL — SIGNIFICANT CHANGE UP (ref 10–20)
CALCIUM SERPL-MCNC: 8 MG/DL — LOW (ref 8.5–10.1)
CHLORIDE SERPL-SCNC: 102 MMOL/L — SIGNIFICANT CHANGE UP (ref 98–110)
CO2 SERPL-SCNC: 23 MMOL/L — SIGNIFICANT CHANGE UP (ref 17–32)
CREAT SERPL-MCNC: 1 MG/DL — SIGNIFICANT CHANGE UP (ref 0.7–1.5)
EOSINOPHIL # BLD AUTO: 0.03 K/UL — SIGNIFICANT CHANGE UP (ref 0–0.7)
EOSINOPHIL NFR BLD AUTO: 0.3 % — SIGNIFICANT CHANGE UP (ref 0–8)
GLUCOSE SERPL-MCNC: 110 MG/DL — HIGH (ref 70–99)
HCT VFR BLD CALC: 29.4 % — LOW (ref 42–52)
HGB BLD-MCNC: 10 G/DL — LOW (ref 14–18)
IMM GRANULOCYTES NFR BLD AUTO: 0.6 % — HIGH (ref 0.1–0.3)
LYMPHOCYTES # BLD AUTO: 1.28 K/UL — SIGNIFICANT CHANGE UP (ref 1.2–3.4)
LYMPHOCYTES # BLD AUTO: 14.1 % — LOW (ref 20.5–51.1)
MAGNESIUM SERPL-MCNC: 2.4 MG/DL — SIGNIFICANT CHANGE UP (ref 1.8–2.4)
MCHC RBC-ENTMCNC: 28.2 PG — SIGNIFICANT CHANGE UP (ref 27–31)
MCHC RBC-ENTMCNC: 34 G/DL — SIGNIFICANT CHANGE UP (ref 32–37)
MCV RBC AUTO: 82.8 FL — SIGNIFICANT CHANGE UP (ref 80–94)
MONOCYTES # BLD AUTO: 0.93 K/UL — HIGH (ref 0.1–0.6)
MONOCYTES NFR BLD AUTO: 10.2 % — HIGH (ref 1.7–9.3)
NEUTROPHILS # BLD AUTO: 6.78 K/UL — HIGH (ref 1.4–6.5)
NEUTROPHILS NFR BLD AUTO: 74.6 % — SIGNIFICANT CHANGE UP (ref 42.2–75.2)
NRBC # BLD: 0 /100 WBCS — SIGNIFICANT CHANGE UP (ref 0–0)
PHOSPHATE SERPL-MCNC: 2.9 MG/DL — SIGNIFICANT CHANGE UP (ref 2.1–4.9)
PLATELET # BLD AUTO: 148 K/UL — SIGNIFICANT CHANGE UP (ref 130–400)
POTASSIUM SERPL-MCNC: 4.2 MMOL/L — SIGNIFICANT CHANGE UP (ref 3.5–5)
POTASSIUM SERPL-SCNC: 4.2 MMOL/L — SIGNIFICANT CHANGE UP (ref 3.5–5)
RBC # BLD: 3.55 M/UL — LOW (ref 4.7–6.1)
RBC # FLD: 13.5 % — SIGNIFICANT CHANGE UP (ref 11.5–14.5)
SODIUM SERPL-SCNC: 136 MMOL/L — SIGNIFICANT CHANGE UP (ref 135–146)
WBC # BLD: 9.09 K/UL — SIGNIFICANT CHANGE UP (ref 4.8–10.8)
WBC # FLD AUTO: 9.09 K/UL — SIGNIFICANT CHANGE UP (ref 4.8–10.8)

## 2018-08-06 RX ADMIN — OXYCODONE AND ACETAMINOPHEN 2 TABLET(S): 5; 325 TABLET ORAL at 10:01

## 2018-08-06 RX ADMIN — Medication 200 MILLIGRAM(S): at 05:06

## 2018-08-06 RX ADMIN — HEPARIN SODIUM 5000 UNIT(S): 5000 INJECTION INTRAVENOUS; SUBCUTANEOUS at 05:06

## 2018-08-06 RX ADMIN — Medication 100 MILLIGRAM(S): at 05:07

## 2018-08-06 RX ADMIN — OXYCODONE AND ACETAMINOPHEN 2 TABLET(S): 5; 325 TABLET ORAL at 03:14

## 2018-08-06 RX ADMIN — OXYCODONE AND ACETAMINOPHEN 2 TABLET(S): 5; 325 TABLET ORAL at 03:44

## 2018-08-06 RX ADMIN — PANTOPRAZOLE SODIUM 40 MILLIGRAM(S): 20 TABLET, DELAYED RELEASE ORAL at 05:07

## 2018-08-06 RX ADMIN — OXYCODONE AND ACETAMINOPHEN 2 TABLET(S): 5; 325 TABLET ORAL at 10:31

## 2018-08-06 NOTE — DISCHARGE NOTE ADULT - CARE PLAN
Principal Discharge DX:	Cholecystitis  Goal:	Now status post laparoscopic cholecystectomy  Assessment and plan of treatment:	Patient will resolution of abdominal pain, discomfort after removal of gall bladder Principal Discharge DX:	Cholecystitis  Goal:	Now status post laparoscopic cholecystectomy  Assessment and plan of treatment:	Patient will have resolution of abdominal pain, discomfort after removal of gall bladder

## 2018-08-06 NOTE — PROGRESS NOTE ADULT - ASSESSMENT
Assessment:  37y Male patient admitted S/P cholecystectomy , with the above physical exam, labs, and imaging findings.    Plan:  -D/C today    Date/Time: 08-06-18 @ 05:54

## 2018-08-06 NOTE — DISCHARGE NOTE ADULT - HOSPITAL COURSE
37-year-old male with PMH of asthma, choledocholithiasis s/p ERCP 3 weeks prior to admission presented to the ED 8/4 with 1 day h/o RUQ abdominal pain, nausea, and vomiting. Pt was afebrile with WBC 21.21, was admitted to the surgical service with diagnosis of acute cholecystitis based on U/S finding. While on the surgical service patient received IV fluids and IV antibiotics.  Patient underwent laparoscopic cholecystectomy on 8/4. Immediately post-op the patient had shortness of breath which was alleviated after adjustment of pain medications. On discharge, he is tolerating regular diet, ambulating comfortably, with adequate urine output and bowel movements. He has been determined to be stable for discharge by the surgical team.

## 2018-08-06 NOTE — PROGRESS NOTE ADULT - SUBJECTIVE AND OBJECTIVE BOX
Progress Note: General Surgery  Patient: EDGARD BARRERA , 37y (1980)Male   MRN: [id number]  Location: [address]  Visit: 08-04-18 Inpatient  Date: 08-06-18 @ 05:54  Hospital Day: 2  Post-op Day: 1    Procedure/Diagnosis: s/p cholecystectomy  Events over 24h: JESUS overnight. Breathlessness yesterday but got better    Vitals: T(F): 98.1 (08-06-18 @ 00:00), Max: 99.7 (08-05-18 @ 07:23)  HR: 80 (08-06-18 @ 00:00)  BP: 104/52 (08-06-18 @ 00:00) (104/52 - 123/56)  RR: 18 (08-06-18 @ 00:00)  SpO2: 97% (08-05-18 @ 17:00)      Diet: Diet, Regular (08-04-18 @ 20:53)    IV Fluids: yes no , Type:   Bowel Function: Bowel movement [  ] Flatus [ x ]   Intake and Output:   08-04-18 @ 07:01  -  08-05-18 @ 07:00  --------------------------------------------------------  IN: 1100 mL    08-05-18 @ 07:01  -  08-06-18 @ 05:54  --------------------------------------------------------  IN: 1550 mL       08-04-18 @ 07:01  -  08-05-18 @ 07:00  --------------------------------------------------------  OUT:    Voided: 200 mL  Total OUT: 200 mL      08-05-18 @ 07:01  -  08-06-18 @ 05:54  --------------------------------------------------------  OUT:    Voided: 502 mL  Total OUT: 502 mL        08-04-18 @ 07:01  -  08-05-18 @ 07:00  --------------------------------------------------------  NET: 900 mL    08-05-18 @ 07:01  -  08-06-18 @ 05:54  --------------------------------------------------------  NET: 1048 mL        Physical Examination:  General Appearance: NAD, alert and cooperative  HEENT: NCAT, WNL  Heart: S1 and S2. No murmurs. Rhythm is regular irregular.   Lungs: Clear to auscultation BL without rales, rhonchi, wheezing, crackles or diminished breath sounds.  Abdomen:  Positive bowel sounds. Soft, nondistended, mame-incisional tenderness nontender. Obese. No rigidity, guarding, or rebound tenderness. No masses palpated. No McBurney point tenderness. No Avila's sign. No Rovsing's sign.   MSK/Extremities: ROM intact BL upper/lower extremities. No joint erythema or tenderness. Normal gait. No significant deformity or joint abnormality. No edema. Peripheral pulses intact. No varicosities. WNL  Neuro: CN II-XII intact. Strength and sensation symmetric and intact throughout. Reflexes 2+ throughout. Cerebellar testing normal. Sensation grossly intact.  Skin: Warm/dry, Normal color, No jaundice.   Incisions/Wounds: Dressings in place, clean, dry and intact, no signs of infection/active bleeding/drainage    Medications: [Standing]  ciprofloxacin   IVPB 400 milliGRAM(s) IV Intermittent every 12 hours  heparin  Injectable 5000 Unit(s) SubCutaneous every 8 hours  metroNIDAZOLE  IVPB 500 milliGRAM(s) IV Intermittent every 8 hours  pantoprazole    Tablet 40 milliGRAM(s) Oral before breakfast    DVT Prophylaxis: heparin  Injectable 5000 Unit(s) SubCutaneous every 8 hours    GI Prophylaxis: pantoprazole    Tablet 40 milliGRAM(s) Oral before breakfast    Antibiotics: ciprofloxacin   IVPB 400 milliGRAM(s) IV Intermittent every 12 hours  metroNIDAZOLE  IVPB 500 milliGRAM(s) IV Intermittent every 8 hours    Anticoagulation:   Medications:[PRN]  acetaminophen   Tablet 650 milliGRAM(s) Oral every 6 hours PRN  acetaminophen   Tablet. 650 milliGRAM(s) Oral every 6 hours PRN  ketorolac   Injectable 15 milliGRAM(s) IV Push every 6 hours PRN  oxyCODONE    5 mG/acetaminophen 325 mG 2 Tablet(s) Oral every 4 hours PRN    Labs:                        10.0   9.09  )-----------( 148      ( 06 Aug 2018 00:22 )             29.4   08-06    136  |  102  |  20  ----------------------------<  110<H>  4.2   |  23  |  1.0    Ca    8.0<L>      06 Aug 2018 00:22  Phos  2.9     08-06  Mg     2.4     08-06    TPro  7.4  /  Alb  4.6  /  TBili  0.8  /  DBili  0.3<H>  /  AST  20  /  ALT  28  /  AlkPhos  109  08-04  LIVER FUNCTIONS - ( 04 Aug 2018 07:09 )  Alb: 4.6 g/dL / Pro: 7.4 g/dL / ALK PHOS: 109 U/L / ALT: 28 U/L / AST: 20 U/L / GGT: x         PT/INR - ( 05 Aug 2018 12:15 )   PT: 17.80 sec;   INR: 1.66 ratio         PTT - ( 05 Aug 2018 12:15 )  PTT:29.5 secABG - ( 05 Aug 2018 15:06 )  pH: 7.45  /  pCO2: 34    /  pO2: 101   / HCO3: 24    / Base Excess: -0.1  /  SaO2: 99              CARDIAC MARKERS ( 05 Aug 2018 14:17 )  x     / <0.01 ng/mL / 84 U/L / x     / x          Urine/Micro:    Imaging:  None/24h    Assessment:  37y Male patient admitted S/P *** , with the above physical exam, labs, and imaging findings.    Plan:      Date/Time: 08-06-18 @ 05:54 Progress Note: General Surgery  Patient: EDGARD BARRERA , 37y (1980)Male   MRN: [id number]  Location: [address]  Visit: 08-04-18 Inpatient  Date: 08-06-18 @ 05:54  Hospital Day: 2  Post-op Day: 1    Procedure/Diagnosis: s/p cholecystectomy  Events over 24h: JESUS overnight. Pt has PMH of Asthma and had acute exacerbation with chest pain, breathlessness, sweating and basal capitations received pain meds and one dose of albuterol yesterday; pt got better.    Vitals: T(F): 98.1 (08-06-18 @ 00:00), Max: 99.7 (08-05-18 @ 07:23)  HR: 80 (08-06-18 @ 00:00)  BP: 104/52 (08-06-18 @ 00:00) (104/52 - 123/56)  RR: 18 (08-06-18 @ 00:00)  SpO2: 97% (08-05-18 @ 17:00)      Diet: Diet, Regular (08-04-18 @ 20:53)    IV Fluids: yes no , Type:   Bowel Function: Bowel movement [  ] Flatus [ x ]   Intake and Output:   08-04-18 @ 07:01  -  08-05-18 @ 07:00  --------------------------------------------------------  IN: 1100 mL    08-05-18 @ 07:01  -  08-06-18 @ 05:54  --------------------------------------------------------  IN: 1550 mL       08-04-18 @ 07:01  -  08-05-18 @ 07:00  --------------------------------------------------------  OUT:    Voided: 200 mL  Total OUT: 200 mL      08-05-18 @ 07:01  -  08-06-18 @ 05:54  --------------------------------------------------------  OUT:    Voided: 502 mL  Total OUT: 502 mL        08-04-18 @ 07:01  -  08-05-18 @ 07:00  --------------------------------------------------------  NET: 900 mL    08-05-18 @ 07:01  -  08-06-18 @ 05:54  --------------------------------------------------------  NET: 1048 mL        Physical Examination:  General Appearance: NAD, alert and cooperative  HEENT: NCAT, WNL  Heart: S1 and S2.  Lungs: Clear to auscultation BL without rales, rhonchi  Abdomen:  Positive bowel sounds. Soft, nondistended, nontender. Obese. No rigidity, guarding, or rebound tenderness. No masses palpated.   MSK/Extremities: ROM intact BL upper/lower extremities. No joint erythema or tenderness. Normal gait.   Neuro: Sensation grossly intact.  Skin: Warm/dry, Normal color, No jaundice.     Medications: [Standing]  ciprofloxacin   IVPB 400 milliGRAM(s) IV Intermittent every 12 hours  heparin  Injectable 5000 Unit(s) SubCutaneous every 8 hours  metroNIDAZOLE  IVPB 500 milliGRAM(s) IV Intermittent every 8 hours  pantoprazole    Tablet 40 milliGRAM(s) Oral before breakfast    DVT Prophylaxis: heparin  Injectable 5000 Unit(s) SubCutaneous every 8 hours    GI Prophylaxis: pantoprazole    Tablet 40 milliGRAM(s) Oral before breakfast    Antibiotics: ciprofloxacin   IVPB 400 milliGRAM(s) IV Intermittent every 12 hours  metroNIDAZOLE  IVPB 500 milliGRAM(s) IV Intermittent every 8 hours    Anticoagulation:   Medications:[PRN]  acetaminophen   Tablet 650 milliGRAM(s) Oral every 6 hours PRN  acetaminophen   Tablet. 650 milliGRAM(s) Oral every 6 hours PRN  ketorolac   Injectable 15 milliGRAM(s) IV Push every 6 hours PRN  oxyCODONE    5 mG/acetaminophen 325 mG 2 Tablet(s) Oral every 4 hours PRN    Labs:                        10.0   9.09  )-----------( 148      ( 06 Aug 2018 00:22 )             29.4   08-06    136  |  102  |  20  ----------------------------<  110<H>  4.2   |  23  |  1.0    Ca    8.0<L>      06 Aug 2018 00:22  Phos  2.9     08-06  Mg     2.4     08-06    TPro  7.4  /  Alb  4.6  /  TBili  0.8  /  DBili  0.3<H>  /  AST  20  /  ALT  28  /  AlkPhos  109  08-04  LIVER FUNCTIONS - ( 04 Aug 2018 07:09 )  Alb: 4.6 g/dL / Pro: 7.4 g/dL / ALK PHOS: 109 U/L / ALT: 28 U/L / AST: 20 U/L / GGT: x         PT/INR - ( 05 Aug 2018 12:15 )   PT: 17.80 sec;   INR: 1.66 ratio         PTT - ( 05 Aug 2018 12:15 )  PTT:29.5 secABG - ( 05 Aug 2018 15:06 )  pH: 7.45  /  pCO2: 34    /  pO2: 101   / HCO3: 24    / Base Excess: -0.1  /  SaO2: 99              CARDIAC MARKERS ( 05 Aug 2018 14:17 )  x     / <0.01 ng/mL / 84 U/L / x     / x          Urine/Micro:    Imaging:  < from: Xray Chest 1 View- PORTABLE-Urgent (08.05.18 @ 14:52) >  Trace left pleural effusion with adjacent atelectasis.

## 2018-08-06 NOTE — DISCHARGE NOTE ADULT - PATIENT PORTAL LINK FT
You can access the [facility] Portal, offered by [facility], by registering with the following website: [email or web address]

## 2018-08-06 NOTE — DISCHARGE NOTE ADULT - MEDICATION SUMMARY - MEDICATIONS TO TAKE
I will START or STAY ON the medications listed below when I get home from the hospital:  None I will START or STAY ON the medications listed below when I get home from the hospital:    Percocet 5/325 oral tablet  -- 1 tab(s) by mouth 1 to 3 times a day MDD:3  -- Caution federal law prohibits the transfer of this drug to any person other  than the person for whom it was prescribed.  May cause drowsiness.  Alcohol may intensify this effect.  Use care when operating dangerous machinery.  This prescription cannot be refilled.  This product contains acetaminophen.  Do not use  with any other product containing acetaminophen to prevent possible liver damage.  Using more of this medication than prescribed may cause serious breathing problems.    -- Indication: For pain

## 2018-08-06 NOTE — DISCHARGE NOTE ADULT - MEDICATION SUMMARY - MEDICATIONS TO STOP TAKING
I will STOP taking the medications listed below when I get home from the hospital:    Cipro 500 mg oral tablet  -- 500 tab(s) by mouth every 12 hours   -- Avoid prolonged or excessive exposure to direct and/or artificial sunlight while taking this medication.  Check with your doctor before becoming pregnant.  Do not take dairy products, antacids, or iron preparations within one hour of this medication.  Finish all this medication unless otherwise directed by prescriber.  Medication should be taken with plenty of water.

## 2018-08-06 NOTE — DISCHARGE NOTE ADULT - NSFOLLOWUPCOMMENTS_ALL_CORE_SIUH
Please call Dr. Hill's office to set up a follow-up appointment in 1 week. Please call Dr. Hill's office tomorrow after noon to set up an appointment.

## 2018-08-06 NOTE — DISCHARGE NOTE ADULT - PLAN OF CARE
Now status post laparoscopic cholecystectomy Patient will resolution of abdominal pain, discomfort after removal of gall bladder Patient will have resolution of abdominal pain, discomfort after removal of gall bladder

## 2018-08-08 LAB — SURGICAL PATHOLOGY STUDY: SIGNIFICANT CHANGE UP

## 2019-02-27 NOTE — PRE-OP CHECKLIST - PATIENT SENT TO
Was the patient seen in the last year in this department? Yes  **LOV 9/24/18. SCHEDULED APPT FOR 9/24/19**    Does patient have an active prescription for medications requested? Yes    Received Request Via: Patient     operating room

## 2019-11-12 NOTE — ASU DISCHARGE PLAN (ADULT/PEDIATRIC). - =======================================================================
Statement Selected Quality 131: Pain Assessment And Follow-Up: Pain assessment using a standardized tool is documented as negative, no follow-up plan required Quality 402: Tobacco Use And Help With Quitting Among Adolescents: Patient screened for tobacco and is an ex-smoker Detail Level: Detailed Quality 130: Documentation Of Current Medications In The Medical Record: Current Medications Documented Quality 226: Preventive Care And Screening: Tobacco Use: Screening And Cessation Intervention: Patient screened for tobacco use and is an ex/non-smoker Quality 431: Preventive Care And Screening: Unhealthy Alcohol Use - Screening: Patient screened for unhealthy alcohol use using a single question and scores less than 2 times per year